# Patient Record
Sex: FEMALE | Race: WHITE | NOT HISPANIC OR LATINO | Employment: OTHER | URBAN - METROPOLITAN AREA
[De-identification: names, ages, dates, MRNs, and addresses within clinical notes are randomized per-mention and may not be internally consistent; named-entity substitution may affect disease eponyms.]

---

## 2017-01-09 ENCOUNTER — TELEPHONE (OUTPATIENT)
Dept: INTERNAL MEDICINE | Facility: CLINIC | Age: 57
End: 2017-01-09

## 2017-01-09 DIAGNOSIS — E03.9 HYPOTHYROIDISM (ACQUIRED): Primary | ICD-10-CM

## 2017-01-09 NOTE — TELEPHONE ENCOUNTER
----- Message from Maria De Jesus Pretty sent at 1/9/2017  8:52 AM EST -----  Contact: pt  Pt calling was advised to recheck TSH and Free T4 is 2-3 months back in October. Pt would like lab orders to schedule a lab. Please advise.     #[phone]    Dr. Nunez pt, Dr. Lemus covering

## 2017-02-06 NOTE — TELEPHONE ENCOUNTER
Maria De Jesus,    Can you please call Ms. Pineda and set her up for a lab appointment in the morning. I tried to call her to make an appointment myself and kept getting a busy signal.    Thank you,    Harry

## 2017-02-09 ENCOUNTER — LAB (OUTPATIENT)
Dept: INTERNAL MEDICINE | Facility: CLINIC | Age: 57
End: 2017-02-09

## 2017-02-09 DIAGNOSIS — E03.9 HYPOTHYROIDISM (ACQUIRED): ICD-10-CM

## 2017-02-09 LAB
T4 FREE SERPL-MCNC: 1.06 NG/DL (ref 0.93–1.7)
TSH SERPL-ACNC: 2.06 MIU/ML (ref 0.27–4.2)

## 2017-02-09 PROCEDURE — 36415 COLL VENOUS BLD VENIPUNCTURE: CPT | Performed by: INTERNAL MEDICINE

## 2017-02-13 ENCOUNTER — PATIENT MESSAGE (OUTPATIENT)
Dept: INTERNAL MEDICINE | Facility: CLINIC | Age: 57
End: 2017-02-13

## 2017-02-13 DIAGNOSIS — E78.5 HYPERLIPIDEMIA, ACQUIRED: Primary | ICD-10-CM

## 2017-02-13 RX ORDER — LEVOTHYROXINE SODIUM 0.05 MG/1
50 TABLET ORAL DAILY
Qty: 30 TABLET | Refills: 3 | Status: SHIPPED | OUTPATIENT
Start: 2017-02-13 | End: 2017-06-07 | Stop reason: SDUPTHER

## 2017-02-13 NOTE — TELEPHONE ENCOUNTER
From: Deb Pineda  To: Kathy Nunez MD  Sent: 2/13/2017 11:54 AM EST  Subject: Prescription Question    Just following up on my message from this weekend. My prescription runs out tomorrow. I left a message with the  on Thursday when I had blood drawn. The medicine is thyroxine. Thanks, Deb Pineda  1960

## 2017-04-20 RX ORDER — ESTRADIOL 1 MG/1
1 TABLET ORAL DAILY
Qty: 90 TABLET | Refills: 3 | Status: SHIPPED | OUTPATIENT
Start: 2017-04-20 | End: 2018-03-29

## 2017-06-07 DIAGNOSIS — E78.5 HYPERLIPIDEMIA, ACQUIRED: ICD-10-CM

## 2017-06-07 RX ORDER — LEVOTHYROXINE SODIUM 0.05 MG/1
TABLET ORAL
Qty: 30 TABLET | Refills: 2 | Status: SHIPPED | OUTPATIENT
Start: 2017-06-07 | End: 2017-09-06 | Stop reason: SDUPTHER

## 2017-06-09 ENCOUNTER — TELEPHONE (OUTPATIENT)
Dept: INTERNAL MEDICINE | Facility: CLINIC | Age: 57
End: 2017-06-09

## 2017-06-09 NOTE — TELEPHONE ENCOUNTER
----- Message from Deb Pineda sent at 6/9/2017  8:28 AM EDT -----  Regarding: Prescription Question  Contact: [phone]  I am out of refills for my levothyroxine 50 mcg. Could that please be called in to [facility] at [phone]?    Thanks.  Deb Pineda

## 2017-09-06 DIAGNOSIS — E78.5 HYPERLIPIDEMIA, ACQUIRED: ICD-10-CM

## 2017-09-06 RX ORDER — LEVOTHYROXINE SODIUM 0.05 MG/1
TABLET ORAL
Qty: 30 TABLET | Refills: 0 | Status: SHIPPED | OUTPATIENT
Start: 2017-09-06 | End: 2017-09-08 | Stop reason: SDUPTHER

## 2017-09-08 DIAGNOSIS — E78.5 HYPERLIPIDEMIA, ACQUIRED: ICD-10-CM

## 2017-09-08 RX ORDER — LEVOTHYROXINE SODIUM 0.05 MG/1
TABLET ORAL
Qty: 30 TABLET | Refills: 0 | Status: SHIPPED | OUTPATIENT
Start: 2017-09-08 | End: 2017-10-05 | Stop reason: SDUPTHER

## 2017-10-05 ENCOUNTER — PATIENT MESSAGE (OUTPATIENT)
Dept: INTERNAL MEDICINE | Facility: CLINIC | Age: 57
End: 2017-10-05

## 2017-10-05 DIAGNOSIS — E78.5 HYPERLIPIDEMIA, ACQUIRED: ICD-10-CM

## 2017-10-05 RX ORDER — LEVOTHYROXINE SODIUM 0.05 MG/1
50 TABLET ORAL DAILY
Qty: 90 TABLET | Refills: 0 | Status: SHIPPED | OUTPATIENT
Start: 2017-10-05 | End: 2018-01-10 | Stop reason: SDUPTHER

## 2017-10-05 NOTE — TELEPHONE ENCOUNTER
From: Deb Pineda  To: Kathy Nunez MD  Sent: 10/5/2017 11:15 AM EDT  Subject: Prescription Question    I have no refills on my prescription for Levothyroxine. Dr. Nunez called in a refill this past month and said I needed to get an appointment. My appointment is on January 11 but I will need refills until then. My pharmacy is [facility] [phone]. Thanks.

## 2018-01-10 DIAGNOSIS — E78.5 HYPERLIPIDEMIA, ACQUIRED: ICD-10-CM

## 2018-01-10 RX ORDER — LEVOTHYROXINE SODIUM 0.05 MG/1
TABLET ORAL
Qty: 90 TABLET | Refills: 1 | Status: SHIPPED | OUTPATIENT
Start: 2018-01-10 | End: 2018-03-29 | Stop reason: SDUPTHER

## 2018-01-17 ENCOUNTER — OFFICE VISIT (OUTPATIENT)
Dept: INTERNAL MEDICINE | Facility: CLINIC | Age: 58
End: 2018-01-17

## 2018-01-17 VITALS
HEART RATE: 86 BPM | OXYGEN SATURATION: 98 % | SYSTOLIC BLOOD PRESSURE: 118 MMHG | DIASTOLIC BLOOD PRESSURE: 80 MMHG | BODY MASS INDEX: 29.76 KG/M2 | WEIGHT: 190 LBS | TEMPERATURE: 98 F

## 2018-01-17 DIAGNOSIS — R11.11 VOMITING WITHOUT NAUSEA, INTRACTABILITY OF VOMITING NOT SPECIFIED, UNSPECIFIED VOMITING TYPE: Primary | ICD-10-CM

## 2018-01-17 DIAGNOSIS — R19.7 DIARRHEA, UNSPECIFIED TYPE: ICD-10-CM

## 2018-01-17 DIAGNOSIS — K52.9 GASTROENTERITIS: ICD-10-CM

## 2018-01-17 PROCEDURE — 99213 OFFICE O/P EST LOW 20 MIN: CPT | Performed by: NURSE PRACTITIONER

## 2018-01-17 NOTE — PROGRESS NOTES
Chavez Pineda is a 57 y.o. female.     HPI Comments: No recent travel. No known ID contact. No recent antibiotic use.     Vomiting    This is a new problem. The current episode started yesterday. The problem occurs 2 to 4 times per day. The problem has been gradually improving. The emesis has an appearance of stomach contents. There has been no fever. Associated symptoms include abdominal pain (cramping, better ), diarrhea and headaches. Pertinent negatives include no chest pain, chills, coughing, fever, myalgias, URI or weight loss. She has tried nothing for the symptoms.   Diarrhea    This is a new problem. The current episode started yesterday. The problem occurs 5 to 10 times per day. The problem has been gradually improving. The stool consistency is described as watery. The patient states that diarrhea awakens her from sleep. Associated symptoms include abdominal pain (cramping, better ), headaches and vomiting. Pertinent negatives include no bloating, chills, coughing, fever, increased  flatus, myalgias, URI or weight loss. Nothing aggravates the symptoms. There are no known risk factors. She has tried nothing for the symptoms.        The following portions of the patient's history were reviewed and updated as appropriate: allergies, current medications and problem list.    Review of Systems   Constitutional: Negative for activity change, appetite change, chills, fatigue, fever and weight loss.   Respiratory: Negative for cough, shortness of breath and wheezing.    Cardiovascular: Negative for chest pain.   Gastrointestinal: Positive for abdominal pain (cramping, better ), diarrhea and vomiting. Negative for bloating, blood in stool, constipation, flatus and nausea.   Genitourinary: Negative for dysuria, frequency, hematuria and urgency.   Musculoskeletal: Negative for myalgias.   Neurological: Positive for headaches.       Objective   Physical Exam   Constitutional: She is oriented to person,  place, and time. She appears well-developed and well-nourished.   HENT:   Head: Normocephalic.   Nose: Nose normal.   Cardiovascular: Regular rhythm and normal heart sounds.  Exam reveals no S3 and no S4.    No murmur heard.  Pulmonary/Chest: Effort normal and breath sounds normal. She has no decreased breath sounds. She has no wheezes. She has no rhonchi. She has no rales.   Abdominal: Normal appearance and bowel sounds are normal. There is no hepatosplenomegaly. There is no tenderness.   Musculoskeletal: She exhibits no edema.   Neurological: She is alert and oriented to person, place, and time. Gait normal.   Skin: Skin is warm and dry.   Psychiatric: She has a normal mood and affect.       Assessment/Plan   Deb was seen today for vomiting and diarrhea.    Diagnoses and all orders for this visit:    Vomiting without nausea, intractability of vomiting not specified, unspecified vomiting type  Comments:  resolved     Diarrhea, unspecified type  Comments:  push fluids, hydrate; may take pepto or imodium as needed     Gastroenteritis  Comments:  bland diet advance as tolerated     I suspect more viral. She will return if worsening of symptoms (abdominal pain, fever, etc).

## 2018-01-17 NOTE — PATIENT INSTRUCTIONS
Viral Gastroenteritis, Adult  Viral gastroenteritis is also known as the stomach flu. This condition is caused by various viruses. These viruses can be passed from person to person very easily (are very contagious). This condition may affect your stomach, small intestine, and large intestine. It can cause sudden watery diarrhea, fever, and vomiting.  Diarrhea and vomiting can make you feel weak and cause you to become dehydrated. You may not be able to keep fluids down. Dehydration can make you tired and thirsty, cause you to have a dry mouth, and decrease how often you urinate. Older adults and people with other diseases or a weak immune system are at higher risk for dehydration.  It is important to replace the fluids that you lose from diarrhea and vomiting. If you become severely dehydrated, you may need to get fluids through an IV tube.  What are the causes?  Gastroenteritis is caused by various viruses, including rotavirus and norovirus. Norovirus is the most common cause in adults.  You can get sick by eating food, drinking water, or touching a surface contaminated with one of these viruses. You can also get sick from sharing utensils or other personal items with an infected person.  What increases the risk?  This condition is more likely to develop in people:  · Who have a weak defense system (immune system).  · Who live with one or more children who are younger than 2 years old.  · Who live in a nursing home.  · Who go on cruise ships.  What are the signs or symptoms?  Symptoms of this condition start suddenly 1-2 days after exposure to a virus. Symptoms may last a few days or as long as a week. The most common symptoms are watery diarrhea and vomiting. Other symptoms include:  · Fever.  · Headache.  · Fatigue.  · Pain in the abdomen.  · Chills.  · Weakness.  · Nausea.  · Muscle aches.  · Loss of appetite.  How is this diagnosed?  This condition is diagnosed with a medical history and physical exam. You may  also have a stool test to check for viruses or other infections.  How is this treated?  This condition typically goes away on its own. The focus of treatment is to restore lost fluids (rehydration). Your health care provider may recommend that you take an oral rehydration solution (ORS) to replace important salts and minerals (electrolytes) in your body. Severe cases of this condition may require giving fluids through an IV tube.  Treatment may also include medicine to help with your symptoms.  Follow these instructions at home:  Follow instructions from your health care provider about how to care for yourself at home.  Eating and drinking  Follow these recommendations as told by your health care provider:  · Take an ORS. This is a drink that is sold at pharmacies and retail stores.  · Drink clear fluids in small amounts as you are able. Clear fluids include water, ice chips, diluted fruit juice, and low-calorie sports drinks.  · Eat bland, easy-to-digest foods in small amounts as you are able. These foods include bananas, applesauce, rice, lean meats, toast, and crackers.  · Avoid fluids that contain a lot of sugar or caffeine, such as energy drinks, sports drinks, and soda.  · Avoid alcohol.  · Avoid spicy or fatty foods.  General instructions  · Drink enough fluid to keep your urine clear or pale yellow.  · Wash your hands often. If soap and water are not available, use hand .  · Make sure that all people in your household wash their hands well and often.  · Take over-the-counter and prescription medicines only as told by your health care provider.  · Rest at home while you recover.  · Watch your condition for any changes.  · Take a warm bath to relieve any burning or pain from frequent diarrhea episodes.  · Keep all follow-up visits as told by your health care provider. This is important.  Contact a health care provider if:  · You cannot keep fluids down.  · Your symptoms get worse.  · You have new  symptoms.  · You feel light-headed or dizzy.  · You have muscle cramps.  Get help right away if:  · You have chest pain.  · You feel extremely weak or you faint.  · You see blood in your vomit.  · Your vomit looks like coffee grounds.  · You have bloody or black stools or stools that look like tar.  · You have a severe headache, a stiff neck, or both.  · You have a rash.  · You have severe pain, cramping, or bloating in your abdomen.  · You have trouble breathing or you are breathing very quickly.  · Your heart is beating very quickly.  · Your skin feels cold and clammy.  · You feel confused.  · You have pain when you urinate.  · You have signs of dehydration, such as:  ¨ Dark urine, very little urine, or no urine.  ¨ Cracked lips.  ¨ Dry mouth.  ¨ Sunken eyes.  ¨ Sleepiness.  ¨ Weakness.  This information is not intended to replace advice given to you by your health care provider. Make sure you discuss any questions you have with your health care provider.  Document Released: 12/18/2006 Document Revised: 05/31/2017 Document Reviewed: 08/23/2016  [employer] Interactive Patient Education © 2017 [employer]

## 2018-02-08 ENCOUNTER — OFFICE VISIT (OUTPATIENT)
Dept: OBSTETRICS AND GYNECOLOGY | Facility: CLINIC | Age: 58
End: 2018-02-08

## 2018-02-08 VITALS
BODY MASS INDEX: 30.13 KG/M2 | WEIGHT: 192 LBS | DIASTOLIC BLOOD PRESSURE: 80 MMHG | SYSTOLIC BLOOD PRESSURE: 142 MMHG | HEIGHT: 67 IN

## 2018-02-08 DIAGNOSIS — N95.0 POST-MENOPAUSAL BLEEDING: ICD-10-CM

## 2018-02-08 DIAGNOSIS — Z01.419 WELL WOMAN EXAM WITH ROUTINE GYNECOLOGICAL EXAM: Primary | ICD-10-CM

## 2018-02-08 PROCEDURE — 58100 BIOPSY OF UTERUS LINING: CPT | Performed by: OBSTETRICS & GYNECOLOGY

## 2018-02-08 PROCEDURE — 99213 OFFICE O/P EST LOW 20 MIN: CPT | Performed by: OBSTETRICS & GYNECOLOGY

## 2018-02-08 PROCEDURE — 99396 PREV VISIT EST AGE 40-64: CPT | Performed by: OBSTETRICS & GYNECOLOGY

## 2018-02-08 NOTE — PROGRESS NOTES
Chavez Pineda is a 57 y.o. female is here today as a self referral for annual.    History of Present Illness-here today for annual exam and checkup.  Also has had a couple episodes of vaginal bleeding in the past few months.    The following portions of the patient's history were reviewed and updated as appropriate: allergies, current medications, past family history, past medical history, past social history, past surgical history and problem list.    Review of Systems   Constitutional: Negative.    HENT: Negative.    Eyes: Negative.    Respiratory: Negative.    Cardiovascular: Negative.    Gastrointestinal: Negative.    Endocrine: Negative.    Genitourinary: Positive for vaginal bleeding.   Musculoskeletal: Negative.    Skin: Negative.    Allergic/Immunologic: Negative.    Neurological: Negative.    Hematological: Negative.    Psychiatric/Behavioral: Negative.        Objective   Physical Exam   Constitutional: She is oriented to person, place, and time. She appears well-developed and well-nourished.   HENT:   Head: Normocephalic and atraumatic.   Nose: Nose normal.   Eyes: Conjunctivae and EOM are normal. Pupils are equal, round, and reactive to light.   Neck: Normal range of motion. Neck supple. No thyromegaly present.   Cardiovascular: Normal rate, regular rhythm, normal heart sounds and intact distal pulses.  Exam reveals no gallop.    No murmur heard.  Pulmonary/Chest: Effort normal and breath sounds normal. No respiratory distress. She has no wheezes. She exhibits no mass, no tenderness, no swelling and no retraction. Right breast exhibits no inverted nipple, no mass, no nipple discharge, no skin change and no tenderness. Left breast exhibits no inverted nipple, no mass, no nipple discharge, no skin change and no tenderness.   Abdominal: Soft. Bowel sounds are normal. She exhibits no distension and no mass. There is no tenderness.   Genitourinary: Rectum normal, vagina normal and uterus normal.  There is no rash, tenderness, lesion or injury on the right labia. There is no rash, tenderness, lesion or injury on the left labia. Uterus is not enlarged and not tender. Cervix exhibits no motion tenderness and no discharge. Right adnexum displays no mass, no tenderness and no fullness. Left adnexum displays no mass, no tenderness and no fullness.   Musculoskeletal: Normal range of motion. She exhibits no edema, tenderness or deformity.   Neurological: She is alert and oriented to person, place, and time.   Skin: Skin is warm and dry.   Psychiatric: She has a normal mood and affect. Her behavior is normal. Judgment and thought content normal.   Nursing note and vitals reviewed.        Assessment/Plan   Problems Addressed this Visit     None      Visit Diagnoses     Well woman exam with routine gynecological exam    -  Primary    Relevant Orders    IGP,rfx Aptima HPV All Pth - ThinPrep Vial, Cervix    Post-menopausal bleeding        Relevant Orders    Biopsy Endometrial    Reference Histopathology - Tissue, Uterus        Pap smear and mammogram done today.  Current with colonoscopy.  DEXA scan discussed for next year.  Not interested in flu vaccine.    Procedure-endometrial biopsy performed with Pipelle without difficulty.  Single-tooth tenaculum applied to the cervix and cervix gently dilated.  Specimen obtained and patient tolerated the procedure well.  Patient to call in 1 week for biopsy report.  Patient also planning on discontinuing hormones.

## 2018-02-13 LAB
DX ICD CODE: NORMAL
DX ICD CODE: NORMAL
PATH REPORT.FINAL DX SPEC: NORMAL
PATH REPORT.GROSS SPEC: NORMAL
PATH REPORT.SITE OF ORIGIN SPEC: NORMAL
PATHOLOGIST NAME: NORMAL
PAYMENT PROCEDURE: NORMAL

## 2018-02-14 ENCOUNTER — TELEPHONE (OUTPATIENT)
Dept: OBSTETRICS AND GYNECOLOGY | Facility: CLINIC | Age: 58
End: 2018-02-14

## 2018-02-14 LAB
CONV .: NORMAL
CYTOLOGIST CVX/VAG CYTO: NORMAL
CYTOLOGY CVX/VAG DOC THIN PREP: NORMAL
DX ICD CODE: NORMAL
HIV 1 & 2 AB SER-IMP: NORMAL
Lab: NORMAL
OTHER STN SPEC: NORMAL
PATH REPORT.FINAL DX SPEC: NORMAL
STAT OF ADQ CVX/VAG CYTO-IMP: NORMAL

## 2018-03-29 ENCOUNTER — OFFICE VISIT (OUTPATIENT)
Dept: INTERNAL MEDICINE | Facility: CLINIC | Age: 58
End: 2018-03-29

## 2018-03-29 VITALS
HEIGHT: 67 IN | SYSTOLIC BLOOD PRESSURE: 118 MMHG | DIASTOLIC BLOOD PRESSURE: 82 MMHG | WEIGHT: 190 LBS | HEART RATE: 55 BPM | OXYGEN SATURATION: 98 % | BODY MASS INDEX: 29.82 KG/M2

## 2018-03-29 DIAGNOSIS — Z11.59 SCREENING FOR VIRAL DISEASE: ICD-10-CM

## 2018-03-29 DIAGNOSIS — Z00.00 ANNUAL PHYSICAL EXAM: Primary | ICD-10-CM

## 2018-03-29 DIAGNOSIS — E03.9 ACQUIRED HYPOTHYROIDISM: ICD-10-CM

## 2018-03-29 DIAGNOSIS — Z12.11 COLON CANCER SCREENING: ICD-10-CM

## 2018-03-29 DIAGNOSIS — Z83.71 FAMILY HISTORY OF COLONIC POLYPS: ICD-10-CM

## 2018-03-29 DIAGNOSIS — E78.5 HYPERLIPIDEMIA, ACQUIRED: ICD-10-CM

## 2018-03-29 LAB
ALBUMIN SERPL-MCNC: 4.4 G/DL (ref 3.5–5.2)
ALBUMIN/GLOB SERPL: 1.7 G/DL
ALP SERPL-CCNC: 49 U/L (ref 39–117)
ALT SERPL-CCNC: 31 U/L (ref 1–33)
AST SERPL-CCNC: 29 U/L (ref 1–32)
BASOPHILS # BLD AUTO: 0.05 10*3/MM3 (ref 0–0.2)
BASOPHILS NFR BLD AUTO: 0.8 % (ref 0–2)
BILIRUB SERPL-MCNC: 0.5 MG/DL (ref 0.1–1.2)
BILIRUB UR QL STRIP: NEGATIVE
BUN SERPL-MCNC: 18 MG/DL (ref 6–20)
BUN/CREAT SERPL: 21.2 (ref 7–25)
CALCIUM SERPL-MCNC: 9.5 MG/DL (ref 8.6–10.5)
CHLORIDE SERPL-SCNC: 101 MMOL/L (ref 98–107)
CHOLEST SERPL-MCNC: 230 MG/DL (ref 0–200)
CLARITY UR: CLEAR
CO2 SERPL-SCNC: 28.8 MMOL/L (ref 22–29)
COLOR UR: YELLOW
CREAT SERPL-MCNC: 0.85 MG/DL (ref 0.57–1)
DEPRECATED RDW RBC AUTO: 41.5 FL (ref 37–54)
EOSINOPHIL # BLD AUTO: 0.16 10*3/MM3 (ref 0–0.7)
EOSINOPHIL NFR BLD AUTO: 2.6 % (ref 0–5)
ERYTHROCYTE [DISTWIDTH] IN BLOOD BY AUTOMATED COUNT: 12.1 % (ref 11.5–15)
GLOBULIN SER CALC-MCNC: 2.6 GM/DL
GLUCOSE SERPL-MCNC: 91 MG/DL (ref 65–99)
GLUCOSE UR STRIP-MCNC: NEGATIVE MG/DL
HCT VFR BLD AUTO: 42.7 % (ref 34.1–44.9)
HDLC SERPL-MCNC: 62 MG/DL (ref 40–60)
HEMOCCULT STL QL IA: NEGATIVE
HGB BLD-MCNC: 14.1 G/DL (ref 11.2–15.7)
HGB UR QL STRIP.AUTO: NEGATIVE
KETONES UR QL STRIP: NEGATIVE
LDLC SERPL CALC-MCNC: 154 MG/DL (ref 0–100)
LEUKOCYTE ESTERASE UR QL STRIP.AUTO: NEGATIVE
LYMPHOCYTES # BLD AUTO: 1.87 10*3/MM3 (ref 0.8–7)
LYMPHOCYTES NFR BLD AUTO: 30.1 % (ref 10–60)
MCH RBC QN AUTO: 31.3 PG (ref 26–34)
MCHC RBC AUTO-ENTMCNC: 33 G/DL (ref 31–37)
MCV RBC AUTO: 94.9 FL (ref 80–100)
MONOCYTES # BLD AUTO: 0.5 10*3/MM3 (ref 0–1)
MONOCYTES NFR BLD AUTO: 8 % (ref 0–13)
NEUTROPHILS # BLD AUTO: 3.64 10*3/MM3 (ref 1–11)
NEUTROPHILS NFR BLD AUTO: 58.5 % (ref 30–85)
NITRITE UR QL STRIP: NEGATIVE
PH UR STRIP.AUTO: 5.5 [PH] (ref 5–8)
PLATELET # BLD AUTO: 210 10*3/MM3 (ref 150–450)
PMV BLD AUTO: 10.7 FL (ref 6–12)
POTASSIUM SERPL-SCNC: 4.2 MMOL/L (ref 3.5–5.2)
PROT SERPL-MCNC: 7 G/DL (ref 6–8.5)
PROT UR QL STRIP: NEGATIVE
RBC # BLD AUTO: 4.5 10*6/MM3 (ref 3.93–5.22)
SODIUM SERPL-SCNC: 141 MMOL/L (ref 136–145)
SP GR UR STRIP: 1.02 (ref 1–1.03)
TRIGL SERPL-MCNC: 71 MG/DL (ref 0–150)
TSH SERPL-ACNC: 2.96 MIU/ML (ref 0.27–4.2)
UROBILINOGEN UR QL STRIP: NORMAL
VLDLC SERPL-MCNC: 14.2 MG/DL (ref 5–40)
WBC NRBC COR # BLD: 6.22 10*3/MM3 (ref 5–10)

## 2018-03-29 PROCEDURE — 99396 PREV VISIT EST AGE 40-64: CPT | Performed by: INTERNAL MEDICINE

## 2018-03-29 PROCEDURE — 82274 ASSAY TEST FOR BLOOD FECAL: CPT | Performed by: INTERNAL MEDICINE

## 2018-03-29 PROCEDURE — 81003 URINALYSIS AUTO W/O SCOPE: CPT | Performed by: INTERNAL MEDICINE

## 2018-03-29 PROCEDURE — 85025 COMPLETE CBC W/AUTO DIFF WBC: CPT | Performed by: INTERNAL MEDICINE

## 2018-03-29 PROCEDURE — 36415 COLL VENOUS BLD VENIPUNCTURE: CPT | Performed by: INTERNAL MEDICINE

## 2018-03-29 RX ORDER — LEVOTHYROXINE SODIUM 0.05 MG/1
50 TABLET ORAL DAILY
Qty: 90 TABLET | Refills: 1 | Status: SHIPPED | OUTPATIENT
Start: 2018-03-29 | End: 2018-12-28 | Stop reason: SDUPTHER

## 2018-03-29 NOTE — PROGRESS NOTES
"Chavez Pineda is a 57 y.o. female here for   Chief Complaint   Patient presents with   • Annual Exam   • Hypothyroidism   .    Vitals:    03/29/18 1046   BP: 118/82   BP Location: Left arm   Patient Position: Sitting   Cuff Size: Adult   Pulse: 55   SpO2: 98%   Weight: 86.2 kg (190 lb)   Height: 170.2 cm (67\")       Body mass index is 29.76 kg/m².    Hypothyroidism   This is a chronic problem. The current episode started more than 1 year ago. The problem occurs constantly. The problem has been unchanged. Pertinent negatives include no abdominal pain, arthralgias, chest pain, chills, congestion, coughing, fatigue, fever, headaches, joint swelling, myalgias, nausea, neck pain, numbness, rash, sore throat, vomiting or weakness.        The following portions of the patient's history were reviewed and updated as appropriate: allergies, current medications, past social history and problem list.    Review of Systems   Constitutional: Negative for appetite change, chills, fatigue, fever and unexpected weight change.   HENT: Negative for congestion, ear pain, mouth sores, sinus pain, sore throat, tinnitus, trouble swallowing and voice change.    Eyes: Negative for pain and visual disturbance.   Respiratory: Negative for cough, choking, shortness of breath and wheezing.    Cardiovascular: Negative for chest pain, palpitations and leg swelling.   Gastrointestinal: Negative for abdominal pain, blood in stool, constipation, diarrhea, nausea and vomiting.   Endocrine: Negative for cold intolerance, heat intolerance, polydipsia and polyuria.   Genitourinary: Positive for enuresis. Negative for difficulty urinating, dysuria, flank pain (1-2x), frequency, hematuria, urgency and vaginal bleeding.   Musculoskeletal: Positive for neck stiffness. Negative for arthralgias, back pain, gait problem, joint swelling, myalgias and neck pain.   Skin: Negative for color change and rash.   Allergic/Immunologic: Positive for " environmental allergies. Negative for food allergies and immunocompromised state.   Neurological: Negative for dizziness, tremors, seizures, syncope, speech difficulty, weakness, numbness and headaches.   Hematological: Negative for adenopathy. Does not bruise/bleed easily.   Psychiatric/Behavioral: Negative for agitation, confusion, decreased concentration, sleep disturbance and suicidal ideas. The patient is not nervous/anxious.        Objective   Physical Exam   Constitutional: She appears well-developed and well-nourished.   HENT:   Right Ear: Hearing, tympanic membrane, external ear and ear canal normal.   Left Ear: Hearing, tympanic membrane, external ear and ear canal normal.   Nose: Right sinus exhibits no maxillary sinus tenderness and no frontal sinus tenderness. Left sinus exhibits no maxillary sinus tenderness and no frontal sinus tenderness.   Eyes: Conjunctivae, EOM and lids are normal. Pupils are equal, round, and reactive to light.   Neck: Trachea normal. Neck supple. No JVD present. Carotid bruit is not present. No tracheal deviation present. No thyroid mass and no thyromegaly present.   Cardiovascular: Normal rate, regular rhythm, S1 normal and S2 normal.  Exam reveals no gallop and no friction rub.    No murmur heard.  Pulses:       Carotid pulses are 2+ on the right side, and 2+ on the left side.       Radial pulses are 2+ on the right side, and 2+ on the left side.        Dorsalis pedis pulses are 2+ on the right side, and 2+ on the left side.        Posterior tibial pulses are 2+ on the right side, and 2+ on the left side.   Pulmonary/Chest: Effort normal and breath sounds normal. Chest wall is not dull to percussion. Right breast exhibits no inverted nipple, no mass, no nipple discharge, no skin change and no tenderness. Left breast exhibits no inverted nipple, no mass, no nipple discharge, no skin change and no tenderness.   Abdominal: Soft. Normal aorta and bowel sounds are normal. She  exhibits no abdominal bruit. There is no hepatosplenomegaly. There is no tenderness. There is no rebound and no guarding. No hernia.   Genitourinary: Rectum normal. Rectal exam shows no mass and guaiac negative stool.   Musculoskeletal: Normal range of motion. She exhibits no edema.   Lymphadenopathy:     She has no cervical adenopathy.     She has no axillary adenopathy.        Right: No supraclavicular adenopathy present.        Left: No supraclavicular adenopathy present.   Neurological: She is alert. She has normal strength. No cranial nerve deficit or sensory deficit. She displays a negative Romberg sign.   Reflex Scores:       Patellar reflexes are 2+ on the right side and 2+ on the left side.  Skin: Skin is warm and dry.   Nursing note and vitals reviewed.      Assessment/Plan   Diagnoses and all orders for this visit:    Annual physical exam  -     CBC Auto Differential; Future  -     Comprehensive Metabolic Panel; Future  -     Lipid Panel; Future  -     Urinalysis With / Microscopic If Indicated - Urine, Clean Catch; Future    Acquired hypothyroidism  Comments:  need TSH today & q 6-12 mos  Orders:  -     TSH Rfx On Abnormal To Free T4; Future  -     levothyroxine (SYNTHROID, LEVOTHROID) 50 MCG tablet; Take 1 tablet by mouth Daily.    Family history of colonic polyps  Comments:  she had 2 c-scopes - no need for repeat c-scope for 10 yrs per dr calle    Colon cancer screening  -     POC FECAL OCCULT BLOOD BY IMMUNOASSAY    Screening for viral disease  -     Hepatitis C antibody; Future    Hyperlipidemia, acquired  Comments:  need wt watchers diet again & daily exercise       Vitamin D=58.8.

## 2018-03-30 LAB — HCV AB S/CO SERPL IA: <0.1 S/CO RATIO (ref 0–0.9)

## 2018-04-26 RX ORDER — ESTRADIOL AND NORETHINDRONE ACETATE 1; .5 MG/1; MG/1
1 TABLET ORAL DAILY
Qty: 90 TABLET | Refills: 3 | Status: SHIPPED | OUTPATIENT
Start: 2018-04-26 | End: 2019-02-25 | Stop reason: SDUPTHER

## 2018-12-28 DIAGNOSIS — E03.9 ACQUIRED HYPOTHYROIDISM: ICD-10-CM

## 2018-12-28 RX ORDER — LEVOTHYROXINE SODIUM 0.05 MG/1
TABLET ORAL
Qty: 90 TABLET | Refills: 1 | Status: SHIPPED | OUTPATIENT
Start: 2018-12-28 | End: 2019-06-18 | Stop reason: SDUPTHER

## 2019-02-25 RX ORDER — ESTRADIOL AND NORETHINDRONE ACETATE 1; .5 MG/1; MG/1
1 TABLET ORAL DAILY
Qty: 90 TABLET | Refills: 0 | Status: SHIPPED | OUTPATIENT
Start: 2019-02-25 | End: 2019-05-29 | Stop reason: SDUPTHER

## 2019-05-29 ENCOUNTER — TELEPHONE (OUTPATIENT)
Dept: OBSTETRICS AND GYNECOLOGY | Facility: CLINIC | Age: 59
End: 2019-05-29

## 2019-05-29 RX ORDER — ESTRADIOL AND NORETHINDRONE ACETATE 1; .5 MG/1; MG/1
1 TABLET ORAL DAILY
Qty: 90 TABLET | Refills: 0 | Status: SHIPPED | OUTPATIENT
Start: 2019-05-29 | End: 2020-02-06

## 2019-05-29 NOTE — TELEPHONE ENCOUNTER
Patient called and is asking for a refill on estradiol-norethindrone (ACTIVELLA) 1-0.5 MG per tablet, she stated she has scheduled an annual appt with her PCP. They could not get her in for a couple of months and would not refill medication.         Thank you

## 2019-05-31 DIAGNOSIS — Z00.00 ANNUAL PHYSICAL EXAM: ICD-10-CM

## 2019-05-31 DIAGNOSIS — E78.5 HYPERLIPIDEMIA, ACQUIRED: ICD-10-CM

## 2019-05-31 DIAGNOSIS — E03.9 ACQUIRED HYPOTHYROIDISM: Primary | ICD-10-CM

## 2019-05-31 DIAGNOSIS — Z12.31 ENCOUNTER FOR SCREENING MAMMOGRAM FOR BREAST CANCER: Primary | ICD-10-CM

## 2019-06-03 ENCOUNTER — APPOINTMENT (OUTPATIENT)
Dept: WOMENS IMAGING | Facility: HOSPITAL | Age: 59
End: 2019-06-03

## 2019-06-03 ENCOUNTER — OFFICE VISIT (OUTPATIENT)
Dept: INTERNAL MEDICINE | Facility: CLINIC | Age: 59
End: 2019-06-03

## 2019-06-03 ENCOUNTER — TELEPHONE (OUTPATIENT)
Dept: INTERNAL MEDICINE | Facility: CLINIC | Age: 59
End: 2019-06-03

## 2019-06-03 ENCOUNTER — LAB (OUTPATIENT)
Dept: INTERNAL MEDICINE | Facility: CLINIC | Age: 59
End: 2019-06-03

## 2019-06-03 DIAGNOSIS — Z12.31 ENCOUNTER FOR SCREENING MAMMOGRAM FOR BREAST CANCER: ICD-10-CM

## 2019-06-03 DIAGNOSIS — E03.9 ACQUIRED HYPOTHYROIDISM: ICD-10-CM

## 2019-06-03 DIAGNOSIS — Z00.00 ANNUAL PHYSICAL EXAM: ICD-10-CM

## 2019-06-03 LAB
ALBUMIN SERPL-MCNC: 4.1 G/DL (ref 3.5–5.2)
ALBUMIN/GLOB SERPL: 1.8 G/DL
ALP SERPL-CCNC: 37 U/L (ref 39–117)
ALT SERPL-CCNC: 12 U/L (ref 1–33)
AST SERPL-CCNC: 14 U/L (ref 1–32)
BACTERIA UR QL AUTO: ABNORMAL /HPF
BASOPHILS # BLD AUTO: 0.05 10*3/MM3 (ref 0–0.2)
BASOPHILS NFR BLD AUTO: 1 % (ref 0–1.5)
BILIRUB SERPL-MCNC: 0.6 MG/DL (ref 0.2–1.2)
BILIRUB UR QL STRIP: NEGATIVE
BUN SERPL-MCNC: 12 MG/DL (ref 6–20)
BUN/CREAT SERPL: 13 (ref 7–25)
CALCIUM SERPL-MCNC: 9 MG/DL (ref 8.6–10.5)
CHLORIDE SERPL-SCNC: 105 MMOL/L (ref 98–107)
CHOLEST SERPL-MCNC: 158 MG/DL (ref 0–200)
CLARITY UR: CLEAR
CO2 SERPL-SCNC: 27.1 MMOL/L (ref 22–29)
COLOR UR: YELLOW
CREAT SERPL-MCNC: 0.92 MG/DL (ref 0.57–1)
EOSINOPHIL # BLD AUTO: 0.08 10*3/MM3 (ref 0–0.4)
EOSINOPHIL # BLD AUTO: 1.6 % (ref 0.3–6.2)
ERYTHROCYTE [DISTWIDTH] IN BLOOD BY AUTOMATED COUNT: 12.1 % (ref 12.3–15.4)
GLOBULIN SER CALC-MCNC: 2.3 GM/DL
GLUCOSE SERPL-MCNC: 97 MG/DL (ref 65–99)
GLUCOSE UR STRIP-MCNC: NEGATIVE MG/DL
HCT VFR BLD AUTO: 42.3 % (ref 34–46.6)
HDLC SERPL-MCNC: 56 MG/DL (ref 40–60)
HGB BLD-MCNC: 13.3 G/DL (ref 12–15.9)
HGB UR QL STRIP.AUTO: ABNORMAL
HYALINE CASTS UR QL AUTO: ABNORMAL /LPF
IMM GRANULOCYTES # BLD: 0.01 10*3/MM3 (ref 0–0.05)
IMM GRANULOCYTES NFR BLD: 0.2 % (ref 0–0.5)
KETONES UR QL STRIP: NEGATIVE
LDLC SERPL CALC-MCNC: 86 MG/DL (ref 0–100)
LEUKOCYTE ESTERASE UR QL STRIP.AUTO: ABNORMAL
LYMPHOCYTES # BLD AUTO: 1.51 10*3/MM3 (ref 0.7–3.1)
LYMPHOCYTES NFR BLD AUTO: 29.3 % (ref 19.6–45.3)
MCH RBC QN AUTO: 30.6 PG (ref 26.6–33)
MCHC RBC AUTO-ENTMCNC: 31.4 G/DL (ref 31.5–35.7)
MCV RBC AUTO: 97.5 FL (ref 79–97)
MONOCYTES # BLD AUTO: 0.37 10*3/MM3 (ref 0.1–0.9)
MONOCYTES NFR BLD AUTO: 7.2 % (ref 5–12)
MUCOUS THREADS URNS QL MICRO: ABNORMAL /HPF
NEUTROPHILS # BLD AUTO: 3.14 10*3/MM3 (ref 1.7–7)
NEUTROPHILS NFR BLD AUTO: 60.7 % (ref 42.7–76)
NITRITE UR QL STRIP: NEGATIVE
NRBC BLD AUTO-RTO: 0 /100 WBC (ref 0–0.2)
PH UR STRIP.AUTO: 6 [PH] (ref 5–8)
PLATELET # BLD AUTO: 196 10*3/MM3 (ref 140–450)
POTASSIUM SERPL-SCNC: 4.2 MMOL/L (ref 3.5–5.2)
PROT SERPL-MCNC: 6.4 G/DL (ref 6–8.5)
PROT UR QL STRIP: NEGATIVE
RBC # BLD AUTO: 4.34 10*6/MM3 (ref 3.77–5.28)
RBC # UR: ABNORMAL /HPF
REF LAB TEST METHOD: ABNORMAL
SODIUM SERPL-SCNC: 142 MMOL/L (ref 136–145)
SP GR UR STRIP: 1.02 (ref 1–1.03)
SQUAMOUS #/AREA URNS HPF: ABNORMAL /HPF
TRIGL SERPL-MCNC: 78 MG/DL (ref 0–150)
TSH SERPL-ACNC: 2.53 MIU/ML (ref 0.27–4.2)
UROBILINOGEN UR QL STRIP: ABNORMAL
VLDLC SERPL-MCNC: 15.6 MG/DL
WBC # BLD AUTO: 5.16 10*3/MM3 (ref 3.4–10.8)
WBC UR QL AUTO: ABNORMAL /HPF
YEAST URNS QL MICRO: ABNORMAL /HPF

## 2019-06-03 PROCEDURE — 77067 SCR MAMMO BI INCL CAD: CPT | Performed by: RADIOLOGY

## 2019-06-03 PROCEDURE — 81001 URINALYSIS AUTO W/SCOPE: CPT | Performed by: INTERNAL MEDICINE

## 2019-06-03 PROCEDURE — 77067 SCR MAMMO BI INCL CAD: CPT | Performed by: INTERNAL MEDICINE

## 2019-06-03 NOTE — TELEPHONE ENCOUNTER
Call made to Ms. Edwin per Babar in our lab to inform her that she had a trace of bacteria in her urine and to see if she had any symptoms per Dr. Nunez.    Per MsRosenda Edwin she isn't having any symptoms.

## 2019-06-18 ENCOUNTER — OFFICE VISIT (OUTPATIENT)
Dept: INTERNAL MEDICINE | Facility: CLINIC | Age: 59
End: 2019-06-18

## 2019-06-18 VITALS
HEART RATE: 63 BPM | DIASTOLIC BLOOD PRESSURE: 86 MMHG | SYSTOLIC BLOOD PRESSURE: 124 MMHG | BODY MASS INDEX: 27.51 KG/M2 | OXYGEN SATURATION: 99 % | WEIGHT: 171.2 LBS | RESPIRATION RATE: 15 BRPM | HEIGHT: 66 IN | TEMPERATURE: 98.6 F

## 2019-06-18 DIAGNOSIS — E03.9 ACQUIRED HYPOTHYROIDISM: ICD-10-CM

## 2019-06-18 DIAGNOSIS — E78.5 HYPERLIPIDEMIA, ACQUIRED: ICD-10-CM

## 2019-06-18 DIAGNOSIS — M54.2 MUSCULOSKELETAL NECK PAIN: ICD-10-CM

## 2019-06-18 DIAGNOSIS — Z00.00 ANNUAL PHYSICAL EXAM: Primary | ICD-10-CM

## 2019-06-18 DIAGNOSIS — Z83.71 FAMILY HISTORY OF COLONIC POLYPS: ICD-10-CM

## 2019-06-18 PROCEDURE — 99396 PREV VISIT EST AGE 40-64: CPT | Performed by: INTERNAL MEDICINE

## 2019-06-18 RX ORDER — LEVOTHYROXINE SODIUM 0.05 MG/1
50 TABLET ORAL DAILY
Qty: 90 TABLET | Refills: 3 | Status: SHIPPED | OUTPATIENT
Start: 2019-06-18 | End: 2020-07-06

## 2019-06-18 NOTE — PROGRESS NOTES
"Chavez Pineda is a 58 y.o. female here for   Chief Complaint   Patient presents with   • Annual Exam   • Hypothyroidism   • Neck Pain     x 1 month   .    Vitals:    06/18/19 1531   BP: 124/86   BP Location: Left arm   Patient Position: Sitting   Cuff Size: Adult   Pulse: 63   Resp: 15   Temp: 98.6 °F (37 °C)   TempSrc: Temporal   SpO2: 99%   Weight: 77.7 kg (171 lb 3.2 oz)   Height: 167.6 cm (66\")       Body mass index is 27.63 kg/m².    History of Present Illness     The following portions of the patient's history were reviewed and updated as appropriate: allergies, current medications, past social history and problem list.    Review of Systems   Constitutional: Negative for appetite change, chills, fatigue, fever and unexpected weight change.   HENT: Negative for congestion, ear pain, mouth sores, postnasal drip, sore throat, tinnitus, trouble swallowing and voice change.    Eyes: Negative for pain and visual disturbance.   Respiratory: Negative for cough, choking, shortness of breath and wheezing.    Cardiovascular: Negative for chest pain, palpitations and leg swelling.   Gastrointestinal: Negative for abdominal pain, blood in stool, constipation, diarrhea, nausea and vomiting.   Endocrine: Negative for cold intolerance, heat intolerance, polydipsia and polyuria.   Genitourinary: Positive for enuresis (1x). Negative for difficulty urinating, dysuria, flank pain, frequency, hematuria, urgency and vaginal bleeding.   Musculoskeletal: Positive for neck pain and neck stiffness. Negative for arthralgias, back pain, gait problem, joint swelling and myalgias.   Skin: Negative for color change and rash.   Allergic/Immunologic: Negative for environmental allergies, food allergies and immunocompromised state.   Neurological: Negative for dizziness, tremors, seizures, syncope, speech difficulty, weakness, numbness and headaches.   Hematological: Negative for adenopathy. Does not bruise/bleed easily. "   Psychiatric/Behavioral: Negative for agitation, confusion, decreased concentration, dysphoric mood, sleep disturbance and suicidal ideas. The patient is not nervous/anxious.        Objective   Physical Exam   Constitutional: She appears well-developed and well-nourished.   HENT:   Right Ear: Hearing, tympanic membrane, external ear and ear canal normal.   Left Ear: Hearing, tympanic membrane, external ear and ear canal normal.   Nose: Right sinus exhibits no maxillary sinus tenderness and no frontal sinus tenderness. Left sinus exhibits no maxillary sinus tenderness and no frontal sinus tenderness.   Eyes: Conjunctivae, EOM and lids are normal. Pupils are equal, round, and reactive to light.   Neck: Trachea normal. Neck supple. No JVD present. Carotid bruit is not present. No tracheal deviation present. No thyroid mass and no thyromegaly present.   Cardiovascular: Normal rate, regular rhythm, S1 normal and S2 normal. Exam reveals no gallop and no friction rub.   No murmur heard.  Pulses:       Carotid pulses are 2+ on the right side, and 2+ on the left side.       Radial pulses are 2+ on the right side, and 2+ on the left side.        Dorsalis pedis pulses are 2+ on the right side, and 2+ on the left side.        Posterior tibial pulses are 2+ on the right side, and 2+ on the left side.   Pulmonary/Chest: Effort normal and breath sounds normal. Chest wall is not dull to percussion. Right breast exhibits no inverted nipple, no mass, no nipple discharge, no skin change and no tenderness. Left breast exhibits no inverted nipple, no mass, no nipple discharge, no skin change and no tenderness.   Abdominal: Soft. Normal aorta and bowel sounds are normal. She exhibits no abdominal bruit. There is no hepatosplenomegaly. There is no tenderness. There is no rebound and no guarding. No hernia.   Musculoskeletal: Normal range of motion. She exhibits no edema.   Lymphadenopathy:     She has no cervical adenopathy.     She has  no axillary adenopathy.        Right: No supraclavicular adenopathy present.        Left: No supraclavicular adenopathy present.   Neurological: She is alert. She has normal strength. No cranial nerve deficit or sensory deficit. She displays a negative Romberg sign.   Reflex Scores:       Patellar reflexes are 2+ on the right side and 2+ on the left side.  Skin: Skin is warm and dry.   Nursing note and vitals reviewed.      Assessment/Plan   Diagnoses and all orders for this visit:    Annual physical exam    Family history of colonic polyps  Comments:  need c-scope q 5 yrs    Hyperlipidemia, acquired  Comments:  need diet/ex    Acquired hypothyroidism  Comments:  need rechk TSH q 6-12 mos (normal 2 wks ago)  Orders:  -     levothyroxine (SYNTHROID, LEVOTHROID) 50 MCG tablet; Take 1 tablet by mouth Daily.    Musculoskeletal neck pain  Comments:  need heat,massage,gentle stretches, correct posture,etc     All labs normal 6/3/2019.      Try to wean off estrogen (qod, then q 3 d,etc)- call if needing lower dose (0.5-0.2)

## 2019-06-18 NOTE — PATIENT INSTRUCTIONS
Health Maintenance, Female  Adopting a healthy lifestyle and getting preventive care can go a long way to promote health and wellness. Talk with your health care provider about what schedule of regular examinations is right for you. This is a good chance for you to check in with your provider about disease prevention and staying healthy.  In between checkups, there are plenty of things you can do on your own. Experts have done a lot of research about which lifestyle changes and preventive measures are most likely to keep you healthy. Ask your health care provider for more information.  Weight and diet  Eat a healthy diet  · Be sure to include plenty of vegetables, fruits, low-fat dairy products, and lean protein.  · Do not eat a lot of foods high in solid fats, added sugars, or salt.  · Get regular exercise. This is one of the most important things you can do for your health.  ? Most adults should exercise for at least 150 minutes each week. The exercise should increase your heart rate and make you sweat (moderate-intensity exercise).  ? Most adults should also do strengthening exercises at least twice a week. This is in addition to the moderate-intensity exercise.    Maintain a healthy weight  · Body mass index (BMI) is a measurement that can be used to identify possible weight problems. It estimates body fat based on height and weight. Your health care provider can help determine your BMI and help you achieve or maintain a healthy weight.  · For females 20 years of age and older:  ? A BMI below 18.5 is considered underweight.  ? A BMI of 18.5 to 24.9 is normal.  ? A BMI of 25 to 29.9 is considered overweight.  ? A BMI of 30 and above is considered obese.    Watch levels of cholesterol and blood lipids  · You should start having your blood tested for lipids and cholesterol at 20 years of age, then have this test every 5 years.  · You may need to have your cholesterol levels checked more often if:  ? Your lipid or  cholesterol levels are high.  ? You are older than 50 years of age.  ? You are at high risk for heart disease.    Cancer screening  Lung Cancer  · Lung cancer screening is recommended for adults 55-80 years old who are at high risk for lung cancer because of a history of smoking.  · A yearly low-dose CT scan of the lungs is recommended for people who:  ? Currently smoke.  ? Have quit within the past 15 years.  ? Have at least a 30-pack-year history of smoking. A pack year is smoking an average of one pack of cigarettes a day for 1 year.  · Yearly screening should continue until it has been 15 years since you quit.  · Yearly screening should stop if you develop a health problem that would prevent you from having lung cancer treatment.    Breast Cancer  · Practice breast self-awareness. This means understanding how your breasts normally appear and feel.  · It also means doing regular breast self-exams. Let your health care provider know about any changes, no matter how small.  · If you are in your 20s or 30s, you should have a clinical breast exam (CBE) by a health care provider every 1-3 years as part of a regular health exam.  · If you are 40 or older, have a CBE every year. Also consider having a breast X-ray (mammogram) every year.  · If you have a family history of breast cancer, talk to your health care provider about genetic screening.  · If you are at high risk for breast cancer, talk to your health care provider about having an MRI and a mammogram every year.  · Breast cancer gene (BRCA) assessment is recommended for women who have family members with BRCA-related cancers. BRCA-related cancers include:  ? Breast.  ? Ovarian.  ? Tubal.  ? Peritoneal cancers.  · Results of the assessment will determine the need for genetic counseling and BRCA1 and BRCA2 testing.    Cervical Cancer  Your health care provider may recommend that you be screened regularly for cancer of the pelvic organs (ovaries, uterus, and  vagina). This screening involves a pelvic examination, including checking for microscopic changes to the surface of your cervix (Pap test). You may be encouraged to have this screening done every 3 years, beginning at age 21.  · For women ages 30-65, health care providers may recommend pelvic exams and Pap testing every 3 years, or they may recommend the Pap and pelvic exam, combined with testing for human papilloma virus (HPV), every 5 years. Some types of HPV increase your risk of cervical cancer. Testing for HPV may also be done on women of any age with unclear Pap test results.  · Other health care providers may not recommend any screening for nonpregnant women who are considered low risk for pelvic cancer and who do not have symptoms. Ask your health care provider if a screening pelvic exam is right for you.  · If you have had past treatment for cervical cancer or a condition that could lead to cancer, you need Pap tests and screening for cancer for at least 20 years after your treatment. If Pap tests have been discontinued, your risk factors (such as having a new sexual partner) need to be reassessed to determine if screening should resume. Some women have medical problems that increase the chance of getting cervical cancer. In these cases, your health care provider may recommend more frequent screening and Pap tests.    Colorectal Cancer  · This type of cancer can be detected and often prevented.  · Routine colorectal cancer screening usually begins at 50 years of age and continues through 75 years of age.  · Your health care provider may recommend screening at an earlier age if you have risk factors for colon cancer.  · Your health care provider may also recommend using home test kits to check for hidden blood in the stool.  · A small camera at the end of a tube can be used to examine your colon directly (sigmoidoscopy or colonoscopy). This is done to check for the earliest forms of colorectal  cancer.  · Routine screening usually begins at age 50.  · Direct examination of the colon should be repeated every 5-10 years through 75 years of age. However, you may need to be screened more often if early forms of precancerous polyps or small growths are found.    Skin Cancer  · Check your skin from head to toe regularly.  · Tell your health care provider about any new moles or changes in moles, especially if there is a change in a mole's shape or color.  · Also tell your health care provider if you have a mole that is larger than the size of a pencil eraser.  · Always use sunscreen. Apply sunscreen liberally and repeatedly throughout the day.  · Protect yourself by wearing long sleeves, pants, a wide-brimmed hat, and sunglasses whenever you are outside.    Heart disease, diabetes, and high blood pressure  · High blood pressure causes heart disease and increases the risk of stroke. High blood pressure is more likely to develop in:  ? People who have blood pressure in the high end of the normal range (130-139/85-89 mm Hg).  ? People who are overweight or obese.  ? People who are .  · If you are 18-39 years of age, have your blood pressure checked every 3-5 years. If you are 40 years of age or older, have your blood pressure checked every year. You should have your blood pressure measured twice--once when you are at a hospital or clinic, and once when you are not at a hospital or clinic. Record the average of the two measurements. To check your blood pressure when you are not at a hospital or clinic, you can use:  ? An automated blood pressure machine at a pharmacy.  ? A home blood pressure monitor.  · If you are between 55 years and 79 years old, ask your health care provider if you should take aspirin to prevent strokes.  · Have regular diabetes screenings. This involves taking a blood sample to check your fasting blood sugar level.  ? If you are at a normal weight and have a low risk for  diabetes, have this test once every three years after 45 years of age.  ? If you are overweight and have a high risk for diabetes, consider being tested at a younger age or more often.  Preventing infection  Hepatitis B  · If you have a higher risk for hepatitis B, you should be screened for this virus. You are considered at high risk for hepatitis B if:  ? You were born in a country where hepatitis B is common. Ask your health care provider which countries are considered high risk.  ? Your parents were born in a high-risk country, and you have not been immunized against hepatitis B (hepatitis B vaccine).  ? You have HIV or AIDS.  ? You use needles to inject street drugs.  ? You live with someone who has hepatitis B.  ? You have had sex with someone who has hepatitis B.  ? You get hemodialysis treatment.  ? You take certain medicines for conditions, including cancer, organ transplantation, and autoimmune conditions.    Hepatitis C  · Blood testing is recommended for:  ? Everyone born from 1945 through 1965.  ? Anyone with known risk factors for hepatitis C.    Sexually transmitted infections (STIs)  · You should be screened for sexually transmitted infections (STIs) including gonorrhea and chlamydia if:  ? You are sexually active and are younger than 24 years of age.  ? You are older than 24 years of age and your health care provider tells you that you are at risk for this type of infection.  ? Your sexual activity has changed since you were last screened and you are at an increased risk for chlamydia or gonorrhea. Ask your health care provider if you are at risk.  · If you do not have HIV, but are at risk, it may be recommended that you take a prescription medicine daily to prevent HIV infection. This is called pre-exposure prophylaxis (PrEP). You are considered at risk if:  ? You are sexually active and do not regularly use condoms or know the HIV status of your partner(s).  ? You take drugs by injection.  ? You  are sexually active with a partner who has HIV.    Talk with your health care provider about whether you are at high risk of being infected with HIV. If you choose to begin PrEP, you should first be tested for HIV. You should then be tested every 3 months for as long as you are taking PrEP.  Pregnancy  · If you are premenopausal and you may become pregnant, ask your health care provider about preconception counseling.  · If you may become pregnant, take 400 to 800 micrograms (mcg) of folic acid every day.  · If you want to prevent pregnancy, talk to your health care provider about birth control (contraception).  Osteoporosis and menopause  · Osteoporosis is a disease in which the bones lose minerals and strength with aging. This can result in serious bone fractures. Your risk for osteoporosis can be identified using a bone density scan.  · If you are 65 years of age or older, or if you are at risk for osteoporosis and fractures, ask your health care provider if you should be screened.  · Ask your health care provider whether you should take a calcium or vitamin D supplement to lower your risk for osteoporosis.  · Menopause may have certain physical symptoms and risks.  · Hormone replacement therapy may reduce some of these symptoms and risks.  Talk to your health care provider about whether hormone replacement therapy is right for you.  Follow these instructions at home:  · Schedule regular health, dental, and eye exams.  · Stay current with your immunizations.  · Do not use any tobacco products including cigarettes, chewing tobacco, or electronic cigarettes.  · If you are pregnant, do not drink alcohol.  · If you are breastfeeding, limit how much and how often you drink alcohol.  · Limit alcohol intake to no more than 1 drink per day for nonpregnant women. One drink equals 12 ounces of beer, 5 ounces of wine, or 1½ ounces of hard liquor.  · Do not use street drugs.  · Do not share needles.  · Ask your health care  provider for help if you need support or information about quitting drugs.  · Tell your health care provider if you often feel depressed.  · Tell your health care provider if you have ever been abused or do not feel safe at home.  This information is not intended to replace advice given to you by your health care provider. Make sure you discuss any questions you have with your health care provider.  Document Released: 07/02/2012 Document Revised: 05/25/2017 Document Reviewed: 09/20/2016  [employer] Patient Education © 2019 [employer]

## 2020-01-29 ENCOUNTER — TELEPHONE (OUTPATIENT)
Dept: INTERNAL MEDICINE | Facility: CLINIC | Age: 60
End: 2020-01-29

## 2020-01-29 NOTE — TELEPHONE ENCOUNTER
Call made to Ms. Pineda to inform her she will need to have a signed release form from the hospital she was seen at. This is what was relayed to me from there HIM.

## 2020-01-29 NOTE — TELEPHONE ENCOUNTER
Patient called and stated that while she was in [place]. She had fell and hit her head and needed to get stitches. Patient is requesting for us to retrieve her medical records regarding incident. Hospital she was seen at is [facility]. Fax number [phone].  Incident happened on 1/27/2020.  Patient stated to list her name, date of birth and when incident happen in fax.  Patient wants a call back.    Please advise.   Patient callback:[phone].

## 2020-01-29 NOTE — TELEPHONE ENCOUNTER
Patient states that she faxed release over to the office to [place] today so that we can get those records.

## 2020-01-31 ENCOUNTER — TELEPHONE (OUTPATIENT)
Dept: INTERNAL MEDICINE | Facility: CLINIC | Age: 60
End: 2020-01-31

## 2020-01-31 NOTE — TELEPHONE ENCOUNTER
Patient called to check if medical records have been faxed in from [facility]. Please advise.    Pt. Call back [phone]

## 2020-01-31 NOTE — TELEPHONE ENCOUNTER
I spoke to Ms. Mojicaadrienne and informed her, as of now I haven't received any records for when she had her hospital visit in [place]. I told her that I would keep looking for the records and follow-up back up with her on Monday.

## 2020-02-06 ENCOUNTER — TELEPHONE (OUTPATIENT)
Dept: INTERNAL MEDICINE | Facility: CLINIC | Age: 60
End: 2020-02-06

## 2020-02-06 ENCOUNTER — OFFICE VISIT (OUTPATIENT)
Dept: INTERNAL MEDICINE | Facility: CLINIC | Age: 60
End: 2020-02-06

## 2020-02-06 VITALS
DIASTOLIC BLOOD PRESSURE: 80 MMHG | RESPIRATION RATE: 16 BRPM | SYSTOLIC BLOOD PRESSURE: 120 MMHG | OXYGEN SATURATION: 99 % | BODY MASS INDEX: 25.39 KG/M2 | HEART RATE: 74 BPM | HEIGHT: 66 IN | TEMPERATURE: 98.5 F | WEIGHT: 158 LBS

## 2020-02-06 DIAGNOSIS — J30.9 ALLERGIC RHINITIS, UNSPECIFIED SEASONALITY, UNSPECIFIED TRIGGER: ICD-10-CM

## 2020-02-06 DIAGNOSIS — R55 SYNCOPE AND COLLAPSE: Primary | ICD-10-CM

## 2020-02-06 DIAGNOSIS — E03.9 ACQUIRED HYPOTHYROIDISM: ICD-10-CM
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
## 2020-02-06 DIAGNOSIS — E78.5 HYPERLIPIDEMIA, ACQUIRED: ICD-10-CM

## 2020-02-06 PROCEDURE — 99214 OFFICE O/P EST MOD 30 MIN: CPT | Performed by: INTERNAL MEDICINE

## 2020-02-06 NOTE — TELEPHONE ENCOUNTER
Message left on v/m that I got a supervisor to release her records to me yesterday evening as our office still hadn't received them from when she herself requested for them to be faxed to our office.

## 2020-02-06 NOTE — TELEPHONE ENCOUNTER
Patient called in wanting to know if lab results were sent from [place].    Patient Contact: [phone]

## 2020-02-06 NOTE — PROGRESS NOTES
"Chavez Pineda is a 59 y.o. female here for   Chief Complaint   Patient presents with   • Fall     1/27/2020- ER visit at [facility]   • Head Injury     Front right side forehead- and hit the back of the head and sustained an injury that required two staples.   .    Vitals:    02/06/20 1021 02/06/20 1241   BP: 140/90 120/80   BP Location: Left arm    Patient Position: Sitting    Cuff Size: Adult    Pulse: 74    Resp: 16    Temp: 98.5 °F (36.9 °C)    TempSrc: Temporal    SpO2: 99%    Weight: 71.7 kg (158 lb)    Height: 167.6 cm (66\")        Body mass index is 25.5 kg/m².    Fall   The accident occurred more than 1 week ago. The fall occurred while standing. She landed on hard floor. The point of impact was the head. The pain is present in the head. The pain is mild. Pertinent negatives include no fever or numbness.        The following portions of the patient's history were reviewed and updated as appropriate: allergies, current medications, past social history and problem list.    Review of Systems   Constitutional: Negative for chills, fatigue and fever.   HENT: Positive for congestion.    Respiratory: Positive for cough (clearing throat ). Negative for shortness of breath and wheezing.    Cardiovascular: Negative for chest pain, palpitations and leg swelling.   Allergic/Immunologic: Positive for environmental allergies.   Neurological: Positive for syncope and light-headedness (better now). Negative for dizziness, tremors, facial asymmetry, speech difficulty, weakness and numbness.   Hematological: Negative for adenopathy. Does not bruise/bleed easily.   Psychiatric/Behavioral: Positive for decreased concentration (improving). Negative for dysphoric mood and sleep disturbance. The patient is not nervous/anxious.        Objective   Physical Exam   Constitutional: She is oriented to person, place, and time. She appears well-developed and well-nourished. No distress.   HENT:   Head: " Normocephalic.   Right Ear: External ear normal. Tympanic membrane is bulging. Tympanic membrane is not erythematous.   Left Ear: External ear normal. Tympanic membrane is bulging. Tympanic membrane is not erythematous.   Nose: Right sinus exhibits no frontal sinus tenderness. Left sinus exhibits no frontal sinus tenderness.   Mouth/Throat: Oropharynx is clear and moist. No oropharyngeal exudate.   Neck: Normal range of motion. Neck supple.   Cardiovascular: Normal rate, regular rhythm and normal heart sounds.   Pulmonary/Chest: Effort normal and breath sounds normal. No respiratory distress. She has no wheezes. She has no rales. She exhibits no tenderness.   Musculoskeletal: She exhibits no edema.   Lymphadenopathy:     She has no cervical adenopathy.   Neurological: She is alert and oriented to person, place, and time. No cranial nerve deficit or sensory deficit. She exhibits normal muscle tone. Coordination normal.   Skin: Skin is warm and dry.   Psychiatric: She has a normal mood and affect. Her behavior is normal.   Nursing note and vitals reviewed.    2 staples in top posterior scalp with healed laceration    Assessment/Plan   Diagnoses and all orders for this visit:    Syncope and collapse  Comments:  from vasovagal episode? - normal neuro exam today - need to always get up slowly - need echo & holter  Orders:  -     Adult Transthoracic Echo Complete W/ Cont if Necessary Per Protocol; Future  -     Holter monitor - 24 hour; Future    Fall, initial encounter  Comments:  fell 1/27 -seen in [place] ER (normal labs,cxr,ekg,ct)  Orders:  -     Adult Transthoracic Echo Complete W/ Cont if Necessary Per Protocol; Future  -     Holter monitor - 24 hour; Future    Acquired hypothyroidism  Comments:  need TSH at least yearly    Hyperlipidemia, acquired  Comments:  continue diet/ex    Allergic rhinitis, unspecified seasonality, unspecified trigger  Comments:  need daily antihis, nasonex daily & nasal saline tid -call  if incr sx     Need carotid doppler (info given today).     2 staples removed from posterior scalp - no blood loss b/c healed lac.     Answers for HPI/ROS submitted by the patient on 1/30/2020   What is the primary reason for your visit?: Other  Please describe your symptoms.: I was out of town and fell and hit my head then about 5 minutes later I passed out and hit the back of my head. Went to the er in [facility]. Several tests were done- ct scan, ekg, chest exray and blood work. All normal. They said to follow up at home. I have been light head some at home. Mostly when turning my head in bed or getting out of bed. , I sent a request to release the results and your office should have requested those. [facility].  Have you had these symptoms before?: No  How long have you been having these symptoms?: past week  Please list any medications you are currently taking for this condition.: Tylenol  Please describe any probable cause for these symptoms. : Hitting my head

## 2020-02-21 ENCOUNTER — HOSPITAL ENCOUNTER (OUTPATIENT)
Dept: CARDIOLOGY | Facility: HOSPITAL | Age: 60
Discharge: HOME | End: 2020-02-21
Admitting: INTERNAL MEDICINE

## 2020-02-21 ENCOUNTER — TRANSCRIBE ORDERS (OUTPATIENT)
Dept: ADMINISTRATIVE | Facility: HOSPITAL | Age: 60
End: 2020-02-21

## 2020-02-21 ENCOUNTER — HOSPITAL ENCOUNTER (OUTPATIENT)
Dept: CARDIOLOGY | Facility: HOSPITAL | Age: 60
Discharge: HOME | End: 2020-02-21

## 2020-02-21 VITALS
BODY MASS INDEX: 25.39 KG/M2 | WEIGHT: 158 LBS | HEIGHT: 66 IN | SYSTOLIC BLOOD PRESSURE: 126 MMHG | HEART RATE: 82 BPM | DIASTOLIC BLOOD PRESSURE: 82 MMHG
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
## 2020-02-21 DIAGNOSIS — R55 SYNCOPE AND COLLAPSE: ICD-10-CM

## 2020-02-21 DIAGNOSIS — Z13.6 ENCOUNTER FOR SCREENING FOR VASCULAR DISEASE: Primary | ICD-10-CM

## 2020-02-21 LAB
AORTIC DIMENSIONLESS INDEX: 0.9 (DI)
BH CV ECHO MEAS - ACS: 1.9 CM
BH CV ECHO MEAS - AO MAX PG: 9 MMHG
BH CV ECHO MEAS - AO MEAN PG (FULL): 2 MMHG
BH CV ECHO MEAS - AO MEAN PG: 5 MMHG
BH CV ECHO MEAS - AO ROOT AREA (BSA CORRECTED): 1.4
BH CV ECHO MEAS - AO ROOT AREA: 5.3 CM^2
BH CV ECHO MEAS - AO ROOT DIAM: 2.6 CM
BH CV ECHO MEAS - AO V2 MAX: 146 CM/SEC
BH CV ECHO MEAS - AO V2 MEAN: 98.5 CM/SEC
BH CV ECHO MEAS - AO V2 VTI: 28.8 CM
BH CV ECHO MEAS - AVA(I,A): 2.7 CM^2
BH CV ECHO MEAS - AVA(I,D): 2.7 CM^2
BH CV ECHO MEAS - BSA(HAYCOCK): 1.8 M^2
BH CV ECHO MEAS - BSA: 1.8 M^2
BH CV ECHO MEAS - BZI_BMI: 25.5 KILOGRAMS/M^2
BH CV ECHO MEAS - BZI_METRIC_HEIGHT: 167.6 CM
BH CV ECHO MEAS - BZI_METRIC_WEIGHT: 71.7 KG
BH CV ECHO MEAS - EDV(CUBED): 97.3 ML
BH CV ECHO MEAS - EDV(MOD-SP2): 71 ML
BH CV ECHO MEAS - EDV(MOD-SP4): 77 ML
BH CV ECHO MEAS - EDV(TEICH): 97.3 ML
BH CV ECHO MEAS - EF(CUBED): 69.4 %
BH CV ECHO MEAS - EF(MOD-BP): 61 %
BH CV ECHO MEAS - EF(MOD-SP2): 63.4 %
BH CV ECHO MEAS - EF(MOD-SP4): 58.4 %
BH CV ECHO MEAS - EF(TEICH): 61 %
BH CV ECHO MEAS - ESV(CUBED): 29.8 ML
BH CV ECHO MEAS - ESV(MOD-SP2): 26 ML
BH CV ECHO MEAS - ESV(MOD-SP4): 32 ML
BH CV ECHO MEAS - ESV(TEICH): 37.9 ML
BH CV ECHO MEAS - FS: 32.6 %
BH CV ECHO MEAS - IVS/LVPW: 0.92
BH CV ECHO MEAS - IVSD: 1.1 CM
BH CV ECHO MEAS - LA DIMENSION: 4 CM
BH CV ECHO MEAS - LA/AO: 1.5
BH CV ECHO MEAS - LAT PEAK E' VEL: 11.6 CM/SEC
BH CV ECHO MEAS - LV DIASTOLIC VOL/BSA (35-75): 42.6 ML/M^2
BH CV ECHO MEAS - LV MASS(C)D: 192.9 GRAMS
BH CV ECHO MEAS - LV MASS(C)DI: 106.6 GRAMS/M^2
BH CV ECHO MEAS - LV MEAN PG: 3 MMHG
BH CV ECHO MEAS - LV SYSTOLIC VOL/BSA (12-30): 17.7 ML/M^2
BH CV ECHO MEAS - LV V1 MAX: 115 CM/SEC
BH CV ECHO MEAS - LV V1 MEAN: 79 CM/SEC
BH CV ECHO MEAS - LV V1 VTI: 25.2 CM
BH CV ECHO MEAS - LVIDD: 4.6 CM
BH CV ECHO MEAS - LVIDS: 3.1 CM
BH CV ECHO MEAS - LVLD AP2: 6.7 CM
BH CV ECHO MEAS - LVLD AP4: 7.2 CM
BH CV ECHO MEAS - LVLS AP2: 5.7 CM
BH CV ECHO MEAS - LVLS AP4: 6.2 CM
BH CV ECHO MEAS - LVOT AREA (M): 3.1 CM^2
BH CV ECHO MEAS - LVOT AREA: 3.1 CM^2
BH CV ECHO MEAS - LVOT DIAM: 2 CM
BH CV ECHO MEAS - LVPWD: 1.2 CM
BH CV ECHO MEAS - MED PEAK E' VEL: 9.9 CM/SEC
BH CV ECHO MEAS - MR MAX PG: 61.5 MMHG
BH CV ECHO MEAS - MR MAX VEL: 392 CM/SEC
BH CV ECHO MEAS - MV A DUR: 0.16 SEC
BH CV ECHO MEAS - MV A MAX VEL: 95.3 CM/SEC
BH CV ECHO MEAS - MV DEC SLOPE: 330 CM/SEC^2
BH CV ECHO MEAS - MV DEC TIME: 0.23 SEC
BH CV ECHO MEAS - MV E MAX VEL: 80.9 CM/SEC
BH CV ECHO MEAS - MV E/A: 0.85
BH CV ECHO MEAS - MV MEAN PG: 2 MMHG
BH CV ECHO MEAS - MV P1/2T MAX VEL: 79.1 CM/SEC
BH CV ECHO MEAS - MV P1/2T: 70.2 MSEC
BH CV ECHO MEAS - MV V2 MEAN: 64.7 CM/SEC
BH CV ECHO MEAS - MV V2 VTI: 24.7 CM
BH CV ECHO MEAS - MVA P1/2T LCG: 2.8 CM^2
BH CV ECHO MEAS - MVA(P1/2T): 3.1 CM^2
BH CV ECHO MEAS - MVA(VTI): 3.2 CM^2
BH CV ECHO MEAS - PA ACC SLOPE: 514 CM/SEC^2
BH CV ECHO MEAS - PA ACC TIME: 0.14 SEC
BH CV ECHO MEAS - PA MAX PG (FULL): 0.58 MMHG
BH CV ECHO MEAS - PA MAX PG: 2.1 MMHG
BH CV ECHO MEAS - PA PR(ACCEL): 17.4 MMHG
BH CV ECHO MEAS - PA V2 MAX: 71.9 CM/SEC
BH CV ECHO MEAS - PI END-D VEL: 99.1 CM/SEC
BH CV ECHO MEAS - PULM A REVS DUR: 0.18 SEC
BH CV ECHO MEAS - PULM A REVS VEL: 38.5 CM/SEC
BH CV ECHO MEAS - PULM DIAS VEL: 46.9 CM/SEC
BH CV ECHO MEAS - PULM S/D: 1.3
BH CV ECHO MEAS - PULM SYS VEL: 61.2 CM/SEC
BH CV ECHO MEAS - PVA(V,A): 4.2 CM^2
BH CV ECHO MEAS - PVA(V,D): 4.2 CM^2
BH CV ECHO MEAS - QP/QS: 0.81
BH CV ECHO MEAS - RAP SYSTOLE: 3 MMHG
BH CV ECHO MEAS - RV MAX PG: 1.5 MMHG
BH CV ECHO MEAS - RV MEAN PG: 1 MMHG
BH CV ECHO MEAS - RV V1 MAX: 60.9 CM/SEC
BH CV ECHO MEAS - RV V1 MEAN: 38.5 CM/SEC
BH CV ECHO MEAS - RV V1 VTI: 13 CM
BH CV ECHO MEAS - RVOT AREA: 4.9 CM^2
BH CV ECHO MEAS - RVOT DIAM: 2.5 CM
BH CV ECHO MEAS - RVSP: 25 MMHG
BH CV ECHO MEAS - SI(AO): 84.5 ML/M^2
BH CV ECHO MEAS - SI(CUBED): 37.3 ML/M^2
BH CV ECHO MEAS - SI(LVOT): 43.8 ML/M^2
BH CV ECHO MEAS - SI(MOD-SP2): 24.9 ML/M^2
BH CV ECHO MEAS - SI(MOD-SP4): 24.9 ML/M^2
BH CV ECHO MEAS - SI(TEICH): 32.8 ML/M^2
BH CV ECHO MEAS - SV(AO): 152.9 ML
BH CV ECHO MEAS - SV(CUBED): 67.5 ML
BH CV ECHO MEAS - SV(LVOT): 79.2 ML
BH CV ECHO MEAS - SV(MOD-SP2): 45 ML
BH CV ECHO MEAS - SV(MOD-SP4): 45 ML
BH CV ECHO MEAS - SV(RVOT): 63.8 ML
BH CV ECHO MEAS - SV(TEICH): 59.4 ML
BH CV ECHO MEAS - TAPSE (>1.6): 2.3 CM2
BH CV ECHO MEAS - TR MAX VEL: 233 CM/SEC
BH CV ECHO MEASUREMENTS AVERAGE E/E' RATIO: 7.53
BH CV XLRA - RV BASE: 3.7 CM
BH CV XLRA - RV LENGTH: 6.2 CM
BH CV XLRA - RV MID: 2.2 CM
BH CV XLRA - TDI S': 13.5 CM/SEC
LEFT ATRIUM VOLUME INDEX: 37 ML/M2
LV EF 2D ECHO EST: 61 %
MAXIMAL PREDICTED HEART RATE: 160 BPM
STRESS TARGET HR: 136 BPM

## 2020-02-21 PROCEDURE — 93306 TTE W/DOPPLER COMPLETE: CPT | Performed by: INTERNAL MEDICINE

## 2020-02-21 PROCEDURE — 93225 XTRNL ECG REC<48 HRS REC: CPT

## 2020-02-21 PROCEDURE — 93226 XTRNL ECG REC<48 HR SCAN A/R: CPT

## 2020-02-21 PROCEDURE — 93306 TTE W/DOPPLER COMPLETE: CPT

## 2020-02-24 PROCEDURE — 93227 XTRNL ECG REC<48 HR R&I: CPT | Performed by: INTERNAL MEDICINE

## 2020-02-26 ENCOUNTER — TELEPHONE (OUTPATIENT)
Dept: INTERNAL MEDICINE | Facility: CLINIC | Age: 60
End: 2020-02-26

## 2020-02-26 ENCOUNTER — PATIENT MESSAGE (OUTPATIENT)
Dept: INTERNAL MEDICINE | Facility: CLINIC | Age: 60
End: 2020-02-26

## 2020-02-26 DIAGNOSIS — R55 SYNCOPE, UNSPECIFIED SYNCOPE TYPE: Primary | ICD-10-CM

## 2020-02-26 DIAGNOSIS — R94.31 ABNORMAL HOLTER EXAM: ICD-10-CM

## 2020-02-26 NOTE — TELEPHONE ENCOUNTER
From: Deb Pineda  Sent: 2/26/2020 2:36 PM EST  To: [facility]  Subject: RE: Test Results Question    Thanks so much     Deb Pineda  ----- Message -----  From: Chantal KIM  Sent: 2/26/2020 2:31 PM EST  To: Deb Pineda  Subject: RE: Test Results Question  Hi Ms. Pineda. I have changed your referral to Dr Jain, You are good to schedule with him. Please let us know if there is anything else you need.     Thanks    [facility]  Referral Coordinator    ----- Message -----   From: Deb Pineda   Sent: 2/26/2020 2:07 PM EST   To: Kathy Nunez MD  Subject: Test Results Question    I received your voicemail today. I called the office you referred me to. I didn’t have a specific dr to ask for so I got the earliest appointment with Dr Jain. When I called your office after that they informed me I need to see Dr York. I can see Dr Jain on Monday but can’t see Dr York until April 23. What do you suggest I do? I would prefer to see someone soon. Thanks.   Deb Pineda

## 2020-02-27 ENCOUNTER — HOSPITAL ENCOUNTER (OUTPATIENT)
Dept: CARDIOLOGY | Facility: HOSPITAL | Age: 60
Discharge: HOME | End: 2020-02-27
Admitting: INTERNAL MEDICINE

## 2020-02-27 ENCOUNTER — PATIENT MESSAGE (OUTPATIENT)
Dept: INTERNAL MEDICINE | Facility: CLINIC | Age: 60
End: 2020-02-27

## 2020-02-27 VITALS
HEART RATE: 68 BPM | HEIGHT: 67 IN | SYSTOLIC BLOOD PRESSURE: 125 MMHG | BODY MASS INDEX: 25.11 KG/M2 | DIASTOLIC BLOOD PRESSURE: 69 MMHG | WEIGHT: 160 LBS

## 2020-02-27 DIAGNOSIS — Z13.6 ENCOUNTER FOR SCREENING FOR VASCULAR DISEASE: ICD-10-CM

## 2020-02-27 LAB
BH CV VAS BP LEFT ARM: NORMAL MMHG
BH CV VAS BP RIGHT ARM: NORMAL MMHG
BH CV XLRA MEAS LEFT ICA/CCA RATIO: 1.1
BH CV XLRA MEAS LEFT MID CCA PSV: NORMAL CM/SEC
BH CV XLRA MEAS LEFT MID ICA PSV: NORMAL CM/SEC
BH CV XLRA MEAS LEFT PROX ECA PSV: NORMAL CM/SEC
BH CV XLRA MEAS RIGHT ICA/CCA RATIO: 1.08
BH CV XLRA MEAS RIGHT MID CCA PSV: NORMAL CM/SEC
BH CV XLRA MEAS RIGHT MID ICA PSV: NORMAL CM/SEC
BH CV XLRA MEAS RIGHT PROX ECA PSV: NORMAL CM/SEC

## 2020-02-27 PROCEDURE — 93799 UNLISTED CV SVC/PROCEDURE: CPT

## 2020-02-27 NOTE — TELEPHONE ENCOUNTER
From: Deb Pineda  Sent: 2/27/2020 12:11 PM EST  To: [facility]  Subject: RE: Visit Follow-Up Question    Great. Thanks so much.  ----- Message -----  From: NAHEED MCGRAW  Sent: 2/27/2020 11:46 AM EST  To: Deb Pineda  Subject: RE: Visit Follow-Up Question  Hi Ms. Pineda. It does look like they were scanned in, so they should be able to see them.     ----- Message -----   From: Deb Pineda   Sent: 2/27/2020 10:27 AM EST   To: Kathy Nunez MD  Subject: Visit Follow-Up Question    [facility] into the [employer] system so the cardiologist can view them. Thanks   Deb Pineda

## 2020-03-02 ENCOUNTER — OFFICE VISIT (OUTPATIENT)
Dept: CARDIOLOGY | Facility: CLINIC | Age: 60
End: 2020-03-02

## 2020-03-02 ENCOUNTER — LAB (OUTPATIENT)
Dept: LAB | Facility: HOSPITAL | Age: 60
End: 2020-03-02

## 2020-03-02 VITALS
HEART RATE: 76 BPM | RESPIRATION RATE: 16 BRPM | HEIGHT: 67 IN | BODY MASS INDEX: 25.27 KG/M2 | SYSTOLIC BLOOD PRESSURE: 128 MMHG | WEIGHT: 161 LBS | OXYGEN SATURATION: 98 % | DIASTOLIC BLOOD PRESSURE: 88 MMHG

## 2020-03-02 DIAGNOSIS — R00.2 PALPITATIONS: ICD-10-CM

## 2020-03-02 DIAGNOSIS — R55 SYNCOPE AND COLLAPSE: Primary | ICD-10-CM

## 2020-03-02 DIAGNOSIS — E03.9 HYPOTHYROIDISM (ACQUIRED): ICD-10-CM

## 2020-03-02 LAB
T-UPTAKE NFR SERPL: 0.99 TBI (ref 0.8–1.3)
T4 SERPL-MCNC: 5.12 MCG/DL (ref 4.5–11.7)
TSH SERPL DL<=0.05 MIU/L-ACNC: 3.13 UIU/ML (ref 0.27–4.2)

## 2020-03-02 PROCEDURE — 84436 ASSAY OF TOTAL THYROXINE: CPT

## 2020-03-02 PROCEDURE — 84443 ASSAY THYROID STIM HORMONE: CPT

## 2020-03-02 PROCEDURE — 99204 OFFICE O/P NEW MOD 45 MIN: CPT | Performed by: INTERNAL MEDICINE

## 2020-03-02 PROCEDURE — 84479 ASSAY OF THYROID (T3 OR T4): CPT

## 2020-03-02 PROCEDURE — 93000 ELECTROCARDIOGRAM COMPLETE: CPT | Performed by: INTERNAL MEDICINE

## 2020-03-02 PROCEDURE — 36415 COLL VENOUS BLD VENIPUNCTURE: CPT

## 2020-03-02 NOTE — PROGRESS NOTES
[facility]      Patient Name: Deb Pineda  :1960  Age: 59 y.o.  Encounter Provider:  Genaro Jain Jr, MD      Chief Complaint:   Chief Complaint   Patient presents with   • Palpitations         HPI  Deb Pineda is a 59 y.o. female past medical history of hypothyroidism who presents for evaluation of syncope.  Patient was in [place] in the end of January and felt slightly dizzy when sitting up from a lying position.  She stood up to go to the bathroom and fell hitting her head on the counter.  She states that her legs just gave out and does not think that she lost consciousness.  She walked in to see her  in the kitchen and when he was looking at the injury on her head her eyes rolled backwards and she passed out.  It was only for a few seconds and there was no convulsive activity.  There was no loss of bowel or bladder continence.  When she awoke she did not feel postictal.  She had some dizziness still but no chest pain, shortness of air or palpitations.  Although she did not notice palpitations with this episode she does have occasional palpitations which lasts for seconds to minutes.  She is very active and does not have any limiting symptoms.  They did a Holter monitor which showed frequent supraventricular ectopy.  The patient did not keep a diary during this monitoring period as she was not told to do so.  Echocardiogram was performed showing normal left ventricular ejection fraction and no significant valvular heart disease.  She has had no recurrent syncope but does note occasional positional dizziness.  She is a lifelong non-smoker who drinks occasionally and denies illicit drug use.  She denies any fevers chills cough or otherwise infectious prodrome.  Both parents were noted to have atrial fibrillation.  She also has a brother who  of ruptured aortic aneurysm but she is unsure of whether or not this was thoracic versus abdominal.  She had her TSH checked in  "June of last year which was within normal limits but no recent check since then.        The following portions of the patient's history were reviewed and updated as appropriate: allergies, current medications, past family history, past medical history, past social history, past surgical history and problem list.      Review of Systems   Constitution: Negative for chills and fever.   HENT: Negative for hoarse voice and sore throat.    Eyes: Negative for double vision and photophobia.   Cardiovascular: Positive for palpitations and syncope. Negative for chest pain, leg swelling, near-syncope, orthopnea and paroxysmal nocturnal dyspnea.   Respiratory: Negative for cough and wheezing.    Skin: Negative for poor wound healing and rash.   Musculoskeletal: Negative for arthritis and joint swelling.   Gastrointestinal: Negative for bloating, abdominal pain, hematemesis and hematochezia.   Neurological: Positive for dizziness. Negative for focal weakness.   Psychiatric/Behavioral: Negative for depression and suicidal ideas.   All other systems reviewed and are negative.      OBJECTIVE:   Vital Signs  Vitals:    03/02/20 1044   BP: 128/88   Pulse: 76   Resp: 16   SpO2: 98%     Estimated body mass index is 25.6 kg/m² as calculated from the following:    Height as of this encounter: 168.9 cm (66.5\").    Weight as of this encounter: 73 kg (161 lb).    Physical Exam   Constitutional: She is oriented to person, place, and time. She appears well-developed and well-nourished.   HENT:   Head: Normocephalic and atraumatic.   Eyes: Pupils are equal, round, and reactive to light. Conjunctivae are normal.   Neck: No JVD present. No thyromegaly present.   Cardiovascular: Exam reveals no gallop and no friction rub.   No murmur heard.  Pulmonary/Chest: No respiratory distress. She exhibits no tenderness.   Abdominal: Bowel sounds are normal. She exhibits no distension.   Musculoskeletal: She exhibits no edema or tenderness.   Neurological: " She is alert and oriented to person, place, and time.   Skin: No rash noted. No erythema.   Psychiatric: She has a normal mood and affect. Judgment normal.   Vitals reviewed.        ECG 12 Lead  Date/Time: 3/2/2020 4:55 PM  Performed by: Genaro Jain Jr., MD  Authorized by: Genaro Jain Jr., MD   Previous ECG: no previous ECG available  Rhythm: sinus rhythm  QRS axis: right    Clinical impression: abnormal EKG                  ASSESSMENT:      Diagnosis Plan   1. Palpitations           PLAN OF CARE:     1. Syncope -etiology is uncertain.  Patient did have some alcohol the night before this episode and does have occasional positional dizziness.  Uncertain if this was a mechanical fall and then concussion with hitting her head which led to the syncope.  She still does note some posterior neck and lower cranial discomfort with changes of head position which sound consistent with postconcussive syndrome.  Given the increased supraventricular ectopy I think it is reasonable to perform a longer period of ambulatory telemetry.  We will order a 7-day ZIO patch.  Patient was cautioned on need for patients with positional changes and lifestyle modification such as remaining well-hydrated.  2. Hypothyroidism -check TSH  3. Family history of cardiovascular disease -brother  at a young age of ruptured aneurysm.  She will collect more data to understand whether or not this was thoracic or abdominal aneurysm so that we will know how to proceed with screening.    Return to clinic 3 months             Discharge Medications           Accurate as of 2020 10:50 AM. If you have any questions, ask your nurse or doctor.               Continue These Medications      Instructions Start Date   levothyroxine 50 MCG tablet  Commonly known as:  SYNTHROID, LEVOTHROID   50 mcg, Oral, Daily      MULTI VITAMIN DAILY PO   1 tablet, Oral, Daily             Thank you for allowing me to participate in the care of your  patient,      Sincerely,   Genaro Jain MD  [facility]  03/02/20  10:50 AM

## 2020-03-18 ENCOUNTER — TELEPHONE (OUTPATIENT)
Dept: CARDIOLOGY | Facility: CLINIC | Age: 60
End: 2020-03-18

## 2020-03-18 RX ORDER — ATENOLOL 25 MG/1
25 TABLET ORAL DAILY
Qty: 30 TABLET | Refills: 3 | Status: SHIPPED | OUTPATIENT
Start: 2020-03-18 | End: 2020-07-07

## 2020-03-18 NOTE — TELEPHONE ENCOUNTER
Patient called back and wanted to let you know that she got your message and that she will go ahead and start on the medication that you sent in.  She asked that you call her back.  Please advise.    CB: [phone]    Thanks,  Alberto

## 2020-04-16 ENCOUNTER — TELEPHONE (OUTPATIENT)
Dept: INTERNAL MEDICINE | Facility: CLINIC | Age: 60
End: 2020-04-16

## 2020-04-16 ENCOUNTER — TELEMEDICINE (OUTPATIENT)
Dept: INTERNAL MEDICINE | Facility: CLINIC | Age: 60
End: 2020-04-16

## 2020-04-16 DIAGNOSIS — E03.9 ACQUIRED HYPOTHYROIDISM: ICD-10-CM

## 2020-04-16 DIAGNOSIS — J30.9 ALLERGIC RHINITIS, UNSPECIFIED SEASONALITY, UNSPECIFIED TRIGGER: ICD-10-CM

## 2020-04-16 DIAGNOSIS — Z12.31 ENCOUNTER FOR SCREENING MAMMOGRAM FOR BREAST CANCER: ICD-10-CM

## 2020-04-16 DIAGNOSIS — M54.2 MUSCULOSKELETAL NECK PAIN: Primary | ICD-10-CM

## 2020-04-16 DIAGNOSIS — G47.09 OTHER INSOMNIA: ICD-10-CM

## 2020-04-16 DIAGNOSIS — R00.2 PALPITATIONS: ICD-10-CM

## 2020-04-16 PROCEDURE — 99214 OFFICE O/P EST MOD 30 MIN: CPT | Performed by: INTERNAL MEDICINE

## 2020-04-16 NOTE — PATIENT INSTRUCTIONS
For the lump in throat, it sounds like allergies-try Mucinex 1200 mg twice a day and salt water in the nose 3 times a day.  May also use Flonase in the nose daily and Allegra or Claritin every day.   For neck pain, need heat, massage, gentle stretches, neck pillow.  Call if symptoms persist-may need x-ray and physical therapy.   For palpitations-will have further evaluation if worsening.   For the stress of caring for your grandchild and the stress of COVID-19-need stress reduction techniques with exercise, meditation, yoga, etc.   For hypothyroidism-recheck labs this year.   For insomnia-may use Tylenol PM (she is used this before without problems).   Your headaches may be from neck tightness or from allergies-treat both and call if your symptoms persist.

## 2020-04-16 NOTE — TELEPHONE ENCOUNTER
Pt called and stated she is having Tightness in her throat that she believes is anxiety related. Pt also would like to know if Dr Nunez saw the Cat scan results from January that was preformed in [place]. Pt is complaining of headaches again     Please advise    [phone]

## 2020-04-16 NOTE — PROGRESS NOTES
Chavez Pineda is a 59 y.o. female here for   Chief Complaint   Patient presents with   • Neck Pain     worse -but no weakness or numbness,etc   • Palpitations     off & on   • Headache     mild/occ   • Anxiety     caring for 1 wk old grandchild   • Insomnia     worried about Covid-19   .    There were no vitals filed for this visit.    There is no height or weight on file to calculate BMI.    Neck Pain    This is a recurrent problem. The current episode started more than 1 year ago. The problem occurs intermittently. The problem has been gradually worsening. The pain is associated with nothing. The pain is present in the occipital region. The quality of the pain is described as aching. The pain is moderate. Associated symptoms include chest pain (upper chest on both sides feels tight) and headaches (mild,occ). Pertinent negatives include no fever, numbness, tingling, trouble swallowing or visual change.   Headache    This is a recurrent problem. The current episode started more than 1 month ago. The problem has been waxing and waning. The pain does not radiate. The quality of the pain is described as aching. Associated symptoms include insomnia and neck pain. Pertinent negatives include no coughing, ear pain, fever, numbness, sore throat (feels like a lump in throat), tingling, tinnitus or visual change.   Anxiety   Presents for follow-up visit. Symptoms include chest pain (upper chest on both sides feels tight), insomnia, nervous/anxious behavior and palpitations. Patient reports no shortness of breath. Symptoms occur most days. The severity of symptoms is moderate.       Insomnia   This is a recurrent problem. The current episode started more than 1 month ago. The problem occurs intermittently. Associated symptoms include chest pain (upper chest on both sides feels tight), headaches (mild,occ) and neck pain. Pertinent negatives include no chills, congestion, coughing, fatigue, fever, numbness, sore  throat (feels like a lump in throat) or visual change.   Palpitations    This is a recurrent problem. The current episode started more than 1 year ago. The problem occurs intermittently. The problem has been waxing and waning. Associated symptoms include anxiety and chest pain (upper chest on both sides feels tight). Pertinent negatives include no coughing, fever, numbness or shortness of breath.        The following portions of the patient's history were reviewed and updated as appropriate: allergies, current medications, past social history and problem list.    Review of Systems   Constitutional: Negative for chills, fatigue and fever.   HENT: Positive for postnasal drip. Negative for congestion, ear pain, sinus pain, sneezing, sore throat (feels like a lump in throat), tinnitus, trouble swallowing and voice change.    Respiratory: Negative for cough, shortness of breath and wheezing.    Cardiovascular: Positive for chest pain (upper chest on both sides feels tight) and palpitations. Negative for leg swelling.   Musculoskeletal: Positive for neck pain and neck stiffness.   Neurological: Positive for headaches (mild,occ). Negative for tingling and numbness.   Psychiatric/Behavioral: Positive for sleep disturbance. Negative for dysphoric mood. The patient is nervous/anxious and has insomnia.        Objective   Physical Exam   Constitutional: She is oriented to person, place, and time. She appears well-developed and well-nourished. No distress.   HENT:   Head: Normocephalic and atraumatic.   Musculoskeletal: Normal range of motion.   Neurological: She is alert and oriented to person, place, and time. No cranial nerve deficit. Coordination normal.   Skin: She is not diaphoretic.   Psychiatric: She has a normal mood and affect. Her behavior is normal. Judgment and thought content normal.       Assessment/Plan   Diagnoses and all orders for this visit:    Musculoskeletal neck pain  Comments:  Call if symptoms  persist    Allergic rhinitis, unspecified seasonality, unspecified trigger  Comments:  She feels a lump in her throat which she needs to cough up-need Mucinex twice a day and salt water in the nose 3 times a day.    Encounter for screening mammogram for breast cancer  -     Mammo Screening Bilateral With CAD; Future    Palpitations    Acquired hypothyroidism  Comments:  Need repeat labs in several months    Other insomnia     Video visit for 25 minutes.     Reviewed hospital results from January 2020 ([place]). CT of head ok.  She had SVT from dehydration? - she had syncope then but no problems since then.

## 2020-04-20 ENCOUNTER — TRANSCRIBE ORDERS (OUTPATIENT)
Dept: ADMINISTRATIVE | Facility: HOSPITAL | Age: 60
End: 2020-04-20

## 2020-04-20 DIAGNOSIS — Z12.31 VISIT FOR SCREENING MAMMOGRAM: Primary | ICD-10-CM

## 2020-06-05 ENCOUNTER — PATIENT MESSAGE (OUTPATIENT)
Dept: INTERNAL MEDICINE | Facility: CLINIC | Age: 60
End: 2020-06-05

## 2020-06-10 NOTE — TELEPHONE ENCOUNTER
Kathy Nunez MD 6/8/2020 12:19 PM EDT    Please work her in sooner for CPE.  ----- Message -----  From: Pascale Luis MA  Sent: 6/8/2020 12:07 PM EDT  To: Kathy Nunez MD  Subject: FW: Test Results Question         ----- Message -----  From: Deb Pineda  Sent: 6/8/2020 12:02 PM EDT  To: [facility]  Subject: RE: Test Results Question      I’ve been going to physical therapy for about a month. It is a little better but still hurts in my head. That would be great if I could come sooner for my physical. I am out of town Chery 15-18 and July 18-25. Thanks  ----- Message -----  From: Kathy Nunez MD  Sent: 6/8/2020 11:30 AM EDT  To: Deb Pineda  Subject: RE: Test Results Question  I see the radiologist's reading of the head CT from 1/27/2020. I will ask our radiology dept if we have received the head CT, but their radiologist reading should be accurate. Are you still having problems? I can see you sooner than august if you are having problems.       ----- Message -----   From: Deb Pineda   Sent: 6/5/2020 6:43 PM EDT   To: Kathy Nunez MD  Subject: Test Results Question    Just checking to see if the [facility] sent the cd of my CT scan and if Dr Nunez has looked at it. Thanks, Deb Pineda

## 2020-06-22 ENCOUNTER — HOSPITAL ENCOUNTER (OUTPATIENT)
Dept: MAMMOGRAPHY | Facility: HOSPITAL | Age: 60
Discharge: HOME | End: 2020-06-22
Admitting: INTERNAL MEDICINE

## 2020-06-22 ENCOUNTER — APPOINTMENT (OUTPATIENT)
Dept: MAMMOGRAPHY | Facility: HOSPITAL | Age: 60
End: 2020-06-22

## 2020-06-22 DIAGNOSIS — Z12.31 VISIT FOR SCREENING MAMMOGRAM: ICD-10-CM

## 2020-06-22 PROCEDURE — 77063 BREAST TOMOSYNTHESIS BI: CPT

## 2020-06-22 PROCEDURE — 77067 SCR MAMMO BI INCL CAD: CPT

## 2020-06-24 ENCOUNTER — OFFICE VISIT (OUTPATIENT)
Dept: CARDIOLOGY | Facility: CLINIC | Age: 60
End: 2020-06-24

## 2020-06-24 VITALS
HEIGHT: 67 IN | HEART RATE: 58 BPM | WEIGHT: 160 LBS | DIASTOLIC BLOOD PRESSURE: 76 MMHG | BODY MASS INDEX: 25.11 KG/M2 | SYSTOLIC BLOOD PRESSURE: 122 MMHG

## 2020-06-24 DIAGNOSIS — I47.1 PSVT (PAROXYSMAL SUPRAVENTRICULAR TACHYCARDIA) (HCC): Primary | ICD-10-CM

## 2020-06-24 DIAGNOSIS — R07.89 CHEST PAIN, ATYPICAL: ICD-10-CM

## 2020-06-24 PROCEDURE — 99214 OFFICE O/P EST MOD 30 MIN: CPT | Performed by: INTERNAL MEDICINE

## 2020-06-24 NOTE — PROGRESS NOTES
[facility]      Patient Name: Deb Pineda  :1960  Age: 59 y.o.  Encounter Provider:  Genaro Jain Jr, MD      Chief Complaint:   Chief Complaint   Patient presents with   • Loss of Consciousness     3 month followup         HPI  Deb Pienda is a 59 y.o. female past medical history of hypothyroidism who presents for follow-up evaluation of syncope.      Last clinic visit note: Patient was in [place] in the end of January and felt slightly dizzy when sitting up from a lying position.  She stood up to go to the bathroom and fell hitting her head on the counter.  She states that her legs just gave out and does not think that she lost consciousness.  She walked in to see her  in the kitchen and when he was looking at the injury on her head her eyes rolled backwards and she passed out.  It was only for a few seconds and there was no convulsive activity.  There was no loss of bowel or bladder continence.  When she awoke she did not feel postictal.  She had some dizziness still but no chest pain, shortness of air or palpitations.  Although she did not notice palpitations with this episode she does have occasional palpitations which lasts for seconds to minutes.  She is very active and does not have any limiting symptoms.  They did a Holter monitor which showed frequent supraventricular ectopy.  The patient did not keep a diary during this monitoring period as she was not told to do so.  Echocardiogram was performed showing normal left ventricular ejection fraction and no significant valvular heart disease.  She has had no recurrent syncope but does note occasional positional dizziness.  She is a lifelong non-smoker who drinks occasionally and denies illicit drug use.  She denies any fevers chills cough or otherwise infectious prodrome.  Both parents were noted to have atrial fibrillation.  She also has a brother who  of ruptured aortic aneurysm but she is unsure of whether or  "not this was thoracic versus abdominal.  She had her TSH checked in  of last year which was within normal limits but no recent check since then.      Extended ambulatory telemetry was performed showing frequent episodes of SVT that were very fast with heart rates going up into the 200s.  Restarted on low-dose atenolol and she is done well with this.  Significant decrease in the frequency of palpitations although she continues to have them nightly.  Resting heart rate today 58 bpm.  No dizziness or syncope.  She remains active without any angina or heart failure.  She was able to discuss past family history with other members and it is been confirmed that her brother  of a thoracic aortic aneurysm rupture.    The following portions of the patient's history were reviewed and updated as appropriate: allergies, current medications, past family history, past medical history, past social history, past surgical history and problem list.      Review of Systems   Constitution: Negative for chills and fever.   HENT: Negative for hoarse voice and sore throat.    Eyes: Negative for double vision and photophobia.   Cardiovascular: Positive for palpitations. Negative for chest pain, leg swelling, near-syncope, orthopnea, paroxysmal nocturnal dyspnea and syncope.   Respiratory: Negative for cough and wheezing.    Skin: Negative for poor wound healing and rash.   Musculoskeletal: Negative for arthritis and joint swelling.   Gastrointestinal: Negative for bloating, abdominal pain, hematemesis and hematochezia.   Neurological: Positive for dizziness. Negative for focal weakness.   Psychiatric/Behavioral: Negative for depression and suicidal ideas.   All other systems reviewed and are negative.      OBJECTIVE:   Vital Signs  Vitals:    20 1305   BP: 122/76   Pulse: 58     Estimated body mass index is 25.06 kg/m² as calculated from the following:    Height as of this encounter: 170.2 cm (67\").    Weight as of this " encounter: 72.6 kg (160 lb).    Physical Exam   Constitutional: She is oriented to person, place, and time. She appears well-developed and well-nourished.   HENT:   Head: Normocephalic and atraumatic.   Eyes: Pupils are equal, round, and reactive to light. Conjunctivae are normal.   Neck: No JVD present. No thyromegaly present.   Cardiovascular: Exam reveals no gallop and no friction rub.   No murmur heard.  Pulmonary/Chest: No respiratory distress. She exhibits no tenderness.   Abdominal: Bowel sounds are normal. She exhibits no distension.   Musculoskeletal: She exhibits no edema or tenderness.   Neurological: She is alert and oriented to person, place, and time.   Skin: No rash noted. No erythema.   Psychiatric: She has a normal mood and affect. Judgment normal.   Vitals reviewed.    Physical exam was reviewed, updated and amended when necessary.    Procedures          ASSESSMENT:      Diagnosis Plan   1. PSVT (paroxysmal supraventricular tachycardia) (CMS/[facility])  Holter Monitor - 24 Hour   2. Chest pain, atypical  CT Chest Without Contrast         PLAN OF CARE:     1. PSVT -over 900 episodes of SVT on ZIO Patch.  She is improved frequency but continues to have frequent palpitations.  Resting heart rate is borderline low.  Check 24-hour Holter to make sure there is no bradycardia arrhythmia in room to increase dosing of atenolol.  2. Chest discomfort -I believe this is related to her complaints of palpitations however given family history of ruptured thoracic aortic aneurysm we will check a CT chest without contrast.  3. Hypothyroidism -TSH within normal range.    Return to clinic 6 months             Discharge Medications           Accurate as of June 24, 2020  2:00 PM. If you have any questions, ask your nurse or doctor.               Continue These Medications      Instructions Start Date   atenolol 25 MG tablet  Commonly known as:  TENORMIN   25 mg, Oral, Daily      levothyroxine 50 MCG tablet  Commonly known  as:  SYNTHROID, LEVOTHROID   50 mcg, Oral, Daily      MULTI VITAMIN DAILY PO   1 tablet, Oral, Daily             Thank you for allowing me to participate in the care of your patient,      Sincerely,   Genaro Jain MD  [facility]  06/24/20  14:00

## 2020-06-29 ENCOUNTER — TELEPHONE (OUTPATIENT)
Dept: CARDIOLOGY | Facility: CLINIC | Age: 60
End: 2020-06-29

## 2020-07-01 ENCOUNTER — HOSPITAL ENCOUNTER (OUTPATIENT)
Dept: CT IMAGING | Facility: HOSPITAL | Age: 60
Discharge: HOME | End: 2020-07-01
Admitting: INTERNAL MEDICINE

## 2020-07-01 DIAGNOSIS — R07.89 CHEST PAIN, ATYPICAL: ICD-10-CM

## 2020-07-01 PROCEDURE — 71250 CT THORAX DX C-: CPT

## 2020-07-03 DIAGNOSIS — E03.9 ACQUIRED HYPOTHYROIDISM: ICD-10-CM

## 2020-07-06 ENCOUNTER — OFFICE VISIT (OUTPATIENT)
Dept: INTERNAL MEDICINE | Facility: CLINIC | Age: 60
End: 2020-07-06

## 2020-07-06 VITALS
WEIGHT: 160 LBS | HEIGHT: 67 IN | RESPIRATION RATE: 14 BRPM | DIASTOLIC BLOOD PRESSURE: 68 MMHG | SYSTOLIC BLOOD PRESSURE: 120 MMHG | BODY MASS INDEX: 25.11 KG/M2

## 2020-07-06 DIAGNOSIS — Z12.11 COLON CANCER SCREENING: ICD-10-CM

## 2020-07-06 DIAGNOSIS — E78.5 HYPERLIPIDEMIA, ACQUIRED: ICD-10-CM

## 2020-07-06 DIAGNOSIS — J30.9 ALLERGIC RHINITIS, UNSPECIFIED SEASONALITY, UNSPECIFIED TRIGGER: ICD-10-CM

## 2020-07-06 DIAGNOSIS — Z12.4 CERVICAL CANCER SCREENING: ICD-10-CM

## 2020-07-06 DIAGNOSIS — R00.2 PALPITATIONS: ICD-10-CM

## 2020-07-06 DIAGNOSIS — M54.2 MUSCULOSKELETAL NECK PAIN: ICD-10-CM

## 2020-07-06 DIAGNOSIS — Z83.71 FAMILY HISTORY OF COLONIC POLYPS: ICD-10-CM

## 2020-07-06 DIAGNOSIS — E03.9 ACQUIRED HYPOTHYROIDISM: ICD-10-CM

## 2020-07-06 DIAGNOSIS — Z00.00 ANNUAL PHYSICAL EXAM: Primary | ICD-10-CM

## 2020-07-06 LAB — HEMOCCULT STL QL IA: NEGATIVE

## 2020-07-06 PROCEDURE — 82274 ASSAY TEST FOR BLOOD FECAL: CPT | Performed by: INTERNAL MEDICINE

## 2020-07-06 PROCEDURE — 99396 PREV VISIT EST AGE 40-64: CPT | Performed by: INTERNAL MEDICINE

## 2020-07-06 RX ORDER — LEVOTHYROXINE SODIUM 0.05 MG/1
TABLET ORAL
Qty: 90 TABLET | Refills: 2 | Status: SHIPPED | OUTPATIENT
Start: 2020-07-06 | End: 2021-03-30

## 2020-07-06 NOTE — PROGRESS NOTES
"Chavez Pineda is a 59 y.o. female here for   Chief Complaint   Patient presents with   • Annual Exam   .    Vitals:    07/06/20 1411   BP: 120/68   BP Location: Left arm   Patient Position: Sitting   Cuff Size: Adult   Resp: 14   Weight: 72.6 kg (160 lb)   Height: 170.2 cm (67\")       Body mass index is 25.06 kg/m².    History of Present Illness     The following portions of the patient's history were reviewed and updated as appropriate: allergies, current medications, past social history and problem list.    Review of Systems   Constitutional: Negative for appetite change, chills, fatigue, fever and unexpected weight change.   HENT: Negative for congestion, ear pain, mouth sores, postnasal drip, sore throat, tinnitus, trouble swallowing and voice change.    Eyes: Negative for pain and visual disturbance.   Respiratory: Negative for cough, choking, shortness of breath and wheezing.    Cardiovascular: Positive for palpitations (occ). Negative for chest pain and leg swelling.   Gastrointestinal: Negative for abdominal pain, blood in stool, constipation, diarrhea, nausea and vomiting.   Endocrine: Negative for cold intolerance, heat intolerance, polydipsia and polyuria.   Genitourinary: Positive for enuresis (1x). Negative for difficulty urinating, dysuria, flank pain, frequency, hematuria, urgency and vaginal bleeding.   Musculoskeletal: Positive for neck pain and neck stiffness (better with PT). Negative for arthralgias, back pain, gait problem, joint swelling and myalgias.   Skin: Negative for color change and rash.   Allergic/Immunologic: Positive for environmental allergies. Negative for food allergies and immunocompromised state.   Neurological: Positive for dizziness (better with PT). Negative for tremors, seizures, syncope, speech difficulty, weakness, numbness and headaches.   Hematological: Negative for adenopathy. Does not bruise/bleed easily.   Psychiatric/Behavioral: Negative for agitation, " confusion, decreased concentration, dysphoric mood, sleep disturbance and suicidal ideas. The patient is not nervous/anxious.        Objective   Physical Exam   Constitutional: She appears well-developed and well-nourished.   HENT:   Right Ear: Hearing, tympanic membrane, external ear and ear canal normal.   Left Ear: Hearing, tympanic membrane, external ear and ear canal normal.   Nose: Right sinus exhibits no maxillary sinus tenderness and no frontal sinus tenderness. Left sinus exhibits no maxillary sinus tenderness and no frontal sinus tenderness.   Eyes: Pupils are equal, round, and reactive to light. Conjunctivae, EOM and lids are normal.   Neck: Trachea normal. Neck supple. No JVD present. Carotid bruit is not present. No tracheal deviation present. No thyroid mass and no thyromegaly present.   Cardiovascular: Normal rate, regular rhythm, S1 normal and S2 normal. Exam reveals no gallop and no friction rub.   No murmur heard.  Pulses:       Carotid pulses are 2+ on the right side, and 2+ on the left side.       Radial pulses are 2+ on the right side, and 2+ on the left side.        Dorsalis pedis pulses are 2+ on the right side, and 2+ on the left side.        Posterior tibial pulses are 2+ on the right side, and 2+ on the left side.   Pulmonary/Chest: Effort normal and breath sounds normal. Chest wall is not dull to percussion. Right breast exhibits no inverted nipple, no mass, no nipple discharge, no skin change and no tenderness. Left breast exhibits no inverted nipple, no mass, no nipple discharge, no skin change and no tenderness.   Abdominal: Soft. Normal aorta and bowel sounds are normal. She exhibits no abdominal bruit. There is no hepatosplenomegaly. There is no tenderness. There is no rebound and no guarding. No hernia.   Genitourinary: Rectum normal, vagina normal and uterus normal. Rectal exam shows guaiac negative stool. No labial fusion. There is no rash, tenderness, lesion or injury on the right  labia. There is no rash, tenderness, lesion or injury on the left labia. Cervix exhibits no discharge. Right adnexum displays no mass, no tenderness and no fullness. Left adnexum displays no mass, no tenderness and no fullness.   Musculoskeletal: Normal range of motion. She exhibits no edema.   Lymphadenopathy:     She has no cervical adenopathy.     She has no axillary adenopathy.        Right: No supraclavicular adenopathy present.        Left: No supraclavicular adenopathy present.   Neurological: She is alert. She has normal strength. No cranial nerve deficit or sensory deficit. She displays a negative Romberg sign.   Reflex Scores:       Patellar reflexes are 2+ on the right side and 2+ on the left side.  Skin: Skin is warm and dry.   Nursing note and vitals reviewed.      Assessment/Plan   Diagnoses and all orders for this visit:    Annual physical exam  -     CBC & Differential; Future  -     Comprehensive Metabolic Panel; Future  -     Lipid Panel; Future  -     Urinalysis With Microscopic If Indicated (No Culture) - Urine, Clean Catch; Future    Palpitations  Comments:  b/c SVT on holter monitor - ok per cardiology  Orders:  -     TSH Rfx On Abnormal To Free T4; Future    Allergic rhinitis, unspecified seasonality, unspecified trigger  Comments:  improved -call if worse    Cervical cancer screening  -     Pap IG, HPV-hr    Colon cancer screening  -     POC FECAL OCCULT BLOOD BY IMMUNOASSAY    Hyperlipidemia, acquired  Comments:  continue diet/ex    Acquired hypothyroidism  Comments:  need rechk TSH    Musculoskeletal neck pain    Family history of colonic polyps  Comments:  need c-scope next year     Discussed need to stay up to date on screening exams as indicated on sex, age & risk factors. I encouraged healthy weight maintenance with diet & exercise. Need good sleep habits & continue to avoid tobacco & excessive alcohol.     Return for fasting labs.

## 2020-07-07 ENCOUNTER — TELEPHONE (OUTPATIENT)
Dept: CARDIOLOGY | Facility: CLINIC | Age: 60
End: 2020-07-07

## 2020-07-07 DIAGNOSIS — R07.89 CHEST PAIN, ATYPICAL: Primary | ICD-10-CM

## 2020-07-07 RX ORDER — ATENOLOL 25 MG/1
TABLET ORAL
Qty: 30 TABLET | Refills: 5 | Status: SHIPPED | OUTPATIENT
Start: 2020-07-07 | End: 2021-01-12 | Stop reason: SDUPTHER

## 2020-07-07 NOTE — TELEPHONE ENCOUNTER
Spoke to patient about CT chest with no evidence of thoracic aortic aneurysm.  However there were vascular calcifications in the distribution of the coronary arteries which would indicate atherosclerotic burden.  Patient continues to have atypical chest pain.  We will plan for an exercise nuclear stress study.

## 2020-07-09 LAB
CYTOLOGIST CVX/VAG CYTO: NORMAL
CYTOLOGY CVX/VAG DOC CYTO: NORMAL
CYTOLOGY CVX/VAG DOC THIN PREP: NORMAL
DX ICD CODE: NORMAL
HIV 1 & 2 AB SER-IMP: NORMAL
HPV I/H RISK 1 DNA CVX QL PROBE+SIG AMP: NEGATIVE
Lab: NORMAL
OTHER STN SPEC: NORMAL
REF LAB TEST METHOD: NORMAL
STAT OF ADQ CVX/VAG CYTO-IMP: NORMAL

## 2020-07-10 ENCOUNTER — HOSPITAL ENCOUNTER (OUTPATIENT)
Dept: CARDIOLOGY | Facility: HOSPITAL | Age: 60
Discharge: HOME | End: 2020-07-10
Admitting: INTERNAL MEDICINE

## 2020-07-10 VITALS — BODY MASS INDEX: 25.12 KG/M2 | HEIGHT: 67 IN | WEIGHT: 160.05 LBS

## 2020-07-10 DIAGNOSIS — R07.89 CHEST PAIN, ATYPICAL: ICD-10-CM

## 2020-07-10 LAB
BH CV NUCLEAR PRIOR STUDY: 3
BH CV STRESS BP STAGE 1: NORMAL
BH CV STRESS BP STAGE 2: NORMAL
BH CV STRESS DURATION MIN STAGE 1: 3
BH CV STRESS DURATION MIN STAGE 2: 3
BH CV STRESS DURATION MIN STAGE 3: 1
BH CV STRESS DURATION SEC STAGE 1: 0
BH CV STRESS DURATION SEC STAGE 2: 0
BH CV STRESS DURATION SEC STAGE 3: 30
BH CV STRESS GRADE STAGE 1: 10
BH CV STRESS GRADE STAGE 2: 12
BH CV STRESS GRADE STAGE 3: 14
BH CV STRESS HR STAGE 1: 88
BH CV STRESS HR STAGE 2: 141
BH CV STRESS HR STAGE 3: 150
BH CV STRESS METS STAGE 1: 5
BH CV STRESS METS STAGE 2: 7.5
BH CV STRESS METS STAGE 3: 10
BH CV STRESS PROTOCOL 1: NORMAL
BH CV STRESS RECOVERY BP: NORMAL MMHG
BH CV STRESS RECOVERY HR: 76 BPM
BH CV STRESS SPEED STAGE 1: 1.7
BH CV STRESS SPEED STAGE 2: 2.5
BH CV STRESS SPEED STAGE 3: 3.4
BH CV STRESS STAGE 1: 1
BH CV STRESS STAGE 2: 2
BH CV STRESS STAGE 3: 3
LV EF NUC BP: 69 %
MAXIMAL PREDICTED HEART RATE: 160 BPM
PERCENT MAX PREDICTED HR: 93.75 %
STRESS BASELINE BP: NORMAL MMHG
STRESS BASELINE HR: 60 BPM
STRESS PERCENT HR: 110 %
STRESS POST ESTIMATED WORKLOAD: 9 METS
STRESS POST EXERCISE DUR MIN: 7 MIN
STRESS POST EXERCISE DUR SEC: 30 SEC
STRESS POST PEAK BP: NORMAL MMHG
STRESS POST PEAK HR: 150 BPM
STRESS TARGET HR: 136 BPM

## 2020-07-10 PROCEDURE — A9502 TC99M TETROFOSMIN: HCPCS | Performed by: INTERNAL MEDICINE

## 2020-07-10 PROCEDURE — 78452 HT MUSCLE IMAGE SPECT MULT: CPT | Performed by: INTERNAL MEDICINE

## 2020-07-10 PROCEDURE — 93016 CV STRESS TEST SUPVJ ONLY: CPT | Performed by: INTERNAL MEDICINE

## 2020-07-10 PROCEDURE — 0 TECHNETIUM TETROFOSMIN KIT: Performed by: INTERNAL MEDICINE

## 2020-07-10 PROCEDURE — 78452 HT MUSCLE IMAGE SPECT MULT: CPT

## 2020-07-10 PROCEDURE — 93017 CV STRESS TEST TRACING ONLY: CPT

## 2020-07-10 PROCEDURE — 93018 CV STRESS TEST I&R ONLY: CPT | Performed by: INTERNAL MEDICINE

## 2020-07-10 RX ADMIN — TETROFOSMIN 1 DOSE: 1.38 INJECTION, POWDER, LYOPHILIZED, FOR SOLUTION INTRAVENOUS at 09:15

## 2020-07-10 RX ADMIN — TETROFOSMIN 1 DOSE: 1.38 INJECTION, POWDER, LYOPHILIZED, FOR SOLUTION INTRAVENOUS at 08:15

## 2020-08-06 ENCOUNTER — LAB (OUTPATIENT)
Dept: INTERNAL MEDICINE | Facility: CLINIC | Age: 60
End: 2020-08-06

## 2020-08-06 DIAGNOSIS — Z00.00 ANNUAL PHYSICAL EXAM: ICD-10-CM

## 2020-08-06 DIAGNOSIS — R00.2 PALPITATIONS: ICD-10-CM

## 2020-08-06 LAB
ALBUMIN SERPL-MCNC: 4.3 G/DL (ref 3.5–5.2)
ALBUMIN/GLOB SERPL: 2 G/DL
ALP SERPL-CCNC: 56 U/L (ref 39–117)
ALT SERPL-CCNC: 22 U/L (ref 1–33)
APPEARANCE UR: CLEAR
AST SERPL-CCNC: 23 U/L (ref 1–32)
BASOPHILS # BLD AUTO: 0.06 10*3/MM3 (ref 0–0.2)
BASOPHILS NFR BLD AUTO: 0.9 % (ref 0–1.5)
BILIRUB SERPL-MCNC: 0.3 MG/DL (ref 0–1.2)
BILIRUB UR QL STRIP: NEGATIVE
BUN SERPL-MCNC: 12 MG/DL (ref 8–23)
BUN/CREAT SERPL: 12.4 (ref 7–25)
CALCIUM SERPL-MCNC: 9.4 MG/DL (ref 8.6–10.5)
CHLORIDE SERPL-SCNC: 102 MMOL/L (ref 98–107)
CHOLEST SERPL-MCNC: 180 MG/DL (ref 0–200)
CO2 SERPL-SCNC: 30.5 MMOL/L (ref 22–29)
COLOR UR: NORMAL
CREAT SERPL-MCNC: 0.97 MG/DL (ref 0.57–1)
EOSINOPHIL # BLD AUTO: 0.06 10*3/MM3 (ref 0–0.4)
EOSINOPHIL NFR BLD AUTO: 0.9 % (ref 0.3–6.2)
ERYTHROCYTE [DISTWIDTH] IN BLOOD BY AUTOMATED COUNT: 11.5 % (ref 12.3–15.4)
GLOBULIN SER CALC-MCNC: 2.1 GM/DL
GLUCOSE SERPL-MCNC: 88 MG/DL (ref 65–99)
GLUCOSE UR QL: NEGATIVE
HCT VFR BLD AUTO: 40.9 % (ref 34–46.6)
HDLC SERPL-MCNC: 53 MG/DL (ref 40–60)
HGB BLD-MCNC: 13.3 G/DL (ref 12–15.9)
HGB UR QL STRIP: NEGATIVE
IMM GRANULOCYTES # BLD AUTO: 0.02 10*3/MM3 (ref 0–0.05)
IMM GRANULOCYTES NFR BLD AUTO: 0.3 % (ref 0–0.5)
KETONES UR QL STRIP: NEGATIVE
LDLC SERPL CALC-MCNC: 107 MG/DL (ref 0–100)
LEUKOCYTE ESTERASE UR QL STRIP: NEGATIVE
LYMPHOCYTES # BLD AUTO: 1.66 10*3/MM3 (ref 0.7–3.1)
LYMPHOCYTES NFR BLD AUTO: 25.2 % (ref 19.6–45.3)
MCH RBC QN AUTO: 30.9 PG (ref 26.6–33)
MCHC RBC AUTO-ENTMCNC: 32.5 G/DL (ref 31.5–35.7)
MCV RBC AUTO: 94.9 FL (ref 79–97)
MONOCYTES # BLD AUTO: 0.43 10*3/MM3 (ref 0.1–0.9)
MONOCYTES NFR BLD AUTO: 6.5 % (ref 5–12)
NEUTROPHILS # BLD AUTO: 4.35 10*3/MM3 (ref 1.7–7)
NEUTROPHILS NFR BLD AUTO: 66.2 % (ref 42.7–76)
NITRITE UR QL STRIP: NEGATIVE
NRBC BLD AUTO-RTO: 0 /100 WBC (ref 0–0.2)
PH UR STRIP: 6.5 [PH] (ref 5–8)
PLATELET # BLD AUTO: 244 10*3/MM3 (ref 140–450)
POTASSIUM SERPL-SCNC: 4.6 MMOL/L (ref 3.5–5.2)
PROT SERPL-MCNC: 6.4 G/DL (ref 6–8.5)
PROT UR QL STRIP: NEGATIVE
RBC # BLD AUTO: 4.31 10*6/MM3 (ref 3.77–5.28)
SODIUM SERPL-SCNC: 141 MMOL/L (ref 136–145)
SP GR UR: 1.02 (ref 1–1.03)
TRIGL SERPL-MCNC: 98 MG/DL (ref 0–150)
TSH SERPL DL<=0.005 MIU/L-ACNC: 2.33 UIU/ML (ref 0.27–4.2)
UROBILINOGEN UR STRIP-MCNC: NORMAL MG/DL
VLDLC SERPL CALC-MCNC: 19.6 MG/DL
WBC # BLD AUTO: 6.58 10*3/MM3 (ref 3.4–10.8)

## 2020-12-29 ENCOUNTER — OFFICE VISIT (OUTPATIENT)
Dept: CARDIOLOGY | Facility: CLINIC | Age: 60
End: 2020-12-29

## 2020-12-29 VITALS
HEIGHT: 67 IN | RESPIRATION RATE: 16 BRPM | SYSTOLIC BLOOD PRESSURE: 128 MMHG | OXYGEN SATURATION: 98 % | DIASTOLIC BLOOD PRESSURE: 76 MMHG | WEIGHT: 173 LBS | BODY MASS INDEX: 27.15 KG/M2 | HEART RATE: 54 BPM

## 2020-12-29 DIAGNOSIS — I47.1 PSVT (PAROXYSMAL SUPRAVENTRICULAR TACHYCARDIA) (HCC): Primary | ICD-10-CM

## 2020-12-29 PROCEDURE — 99214 OFFICE O/P EST MOD 30 MIN: CPT | Performed by: INTERNAL MEDICINE

## 2020-12-29 NOTE — PROGRESS NOTES
[facility]      Patient Name: Deb Pineda  :1960  Age: 60 y.o.  Encounter Provider:  Genaro Jain Jr, MD      Chief Complaint:   Chief Complaint   Patient presents with   • Rapid Heart Rate     6 month follow up for Atypical Chest pain and PSVT          HPI  Deb Pineda is a 60 y.o. female past medical history of hypothyroidism who presents for follow-up evaluation of syncope.      Last clinic visit note: Patient was in [place] in the end of January and felt slightly dizzy when sitting up from a lying position.  She stood up to go to the bathroom and fell hitting her head on the counter.  She states that her legs just gave out and does not think that she lost consciousness.  She walked in to see her  in the kitchen and when he was looking at the injury on her head her eyes rolled backwards and she passed out.  It was only for a few seconds and there was no convulsive activity.  There was no loss of bowel or bladder continence.  When she awoke she did not feel postictal.  She had some dizziness still but no chest pain, shortness of air or palpitations.  Although she did not notice palpitations with this episode she does have occasional palpitations which lasts for seconds to minutes.  She is very active and does not have any limiting symptoms.  They did a Holter monitor which showed frequent supraventricular ectopy.  The patient did not keep a diary during this monitoring period as she was not told to do so.  Echocardiogram was performed showing normal left ventricular ejection fraction and no significant valvular heart disease.  She has had no recurrent syncope but does note occasional positional dizziness.  She is a lifelong non-smoker who drinks occasionally and denies illicit drug use.  She denies any fevers chills cough or otherwise infectious prodrome.  Both parents were noted to have atrial fibrillation.  She also has a brother who  of ruptured aortic aneurysm  but she is unsure of whether or not this was thoracic versus abdominal.  She had her TSH checked in  of last year which was within normal limits but no recent check since then.      Extended ambulatory telemetry was performed showing frequent episodes of SVT that were very fast with heart rates going up into the 200s.  Restarted on low-dose atenolol and she is done well with this.  Significant decrease in the frequency of palpitations although she continues to have them nightly.  Resting heart rate today 58 bpm.  No dizziness or syncope.  She remains active without any angina or heart failure.  She was able to discuss past family history with other members and it is been confirmed that her brother  of a thoracic aortic aneurysm rupture.    Given her family history of thoracic aortic aneurysm rupture a CT without contrast was performed showing normal caliber thoracic aorta.  Vascular calcifications were noted within the coronary distribution and given recurrent atypical chest pain a stress study was performed showing no evidence of myocardial ischemia.  Since starting low-dose metoprolol she is done well.  She remains active without limiting symptoms.  She has occasional palpitations that last anywhere from 15 to 30 seconds usually at night.  She does not have symptoms when she is exercising.  She is tolerating medications well.  Social and family history reviewed and not pertinent to this clinic visit.    The following portions of the patient's history were reviewed and updated as appropriate: allergies, current medications, past family history, past medical history, past social history, past surgical history and problem list.      Review of Systems   Constitution: Negative for chills and fever.   HENT: Negative for hoarse voice and sore throat.    Eyes: Negative for double vision and photophobia.   Cardiovascular: Negative for chest pain, leg swelling, near-syncope, orthopnea, palpitations, paroxysmal  "nocturnal dyspnea and syncope.   Respiratory: Negative for cough and wheezing.    Skin: Negative for itching, poor wound healing and rash.   Musculoskeletal: Negative for arthritis, joint pain and joint swelling.   Gastrointestinal: Negative for bloating, abdominal pain, hematemesis and hematochezia.   Neurological: Negative for dizziness and focal weakness.   Psychiatric/Behavioral: Negative for altered mental status, depression and suicidal ideas.   All other systems reviewed and are negative.    ROS was reviewed, updated and amended when necessary.    OBJECTIVE:   Vital Signs  There were no vitals filed for this visit.  Estimated body mass index is 25.06 kg/m² as calculated from the following:    Height as of 7/10/20: 170.2 cm (67.01\").    Weight as of 7/10/20: 72.6 kg (160 lb 0.9 oz).    Physical Exam   Constitutional: She is oriented to person, place, and time. She appears well-developed and well-nourished.   HENT:   Head: Normocephalic and atraumatic.   Eyes: Pupils are equal, round, and reactive to light. Conjunctivae are normal.   Neck: No JVD present. No thyromegaly present.   Cardiovascular: Exam reveals no gallop and no friction rub.   No murmur heard.  Pulmonary/Chest: No respiratory distress. She exhibits no tenderness.   Abdominal: Bowel sounds are normal. She exhibits no distension.   Musculoskeletal:         General: No tenderness or edema.   Neurological: She is alert and oriented to person, place, and time.   Skin: No rash noted. No erythema.   Psychiatric: She has a normal mood and affect. Judgment normal.   Vitals reviewed.    Physical exam was reviewed, updated amended when necessary.    Procedures          ASSESSMENT:     Atypical chest pain  PSVT  Hypothyroidism    PLAN OF CARE:     1. PSVT -over 900 episodes of SVT on ZIO Patch.  She is improved frequency but continues to have frequent palpitations.  Repeat telemetry monitor shows no more than short bursts of SVT.  She is tolerating " beta-blocker well.  Continue same.  2. Chest discomfort -no evidence of thoracic aortic aneurysm and no evidence of myocardial ischemia.  Mild vascular calcifications were noted in the coronary distribution but the patient has well-controlled lipids and her ASCVD is 3.5%.  No indication for starting aspirin at this time.  3. Hypothyroidism -TSH within normal range.    Return to clinic 12 months             Discharge Medications          Accurate as of December 29, 2020 11:45 AM. If you have any questions, ask your nurse or doctor.            Continue These Medications      Instructions Start Date   atenolol 25 MG tablet  Commonly known as: TENORMIN   TAKE ONE TABLET BY MOUTH DAILY      levothyroxine 50 MCG tablet  Commonly known as: SYNTHROID, LEVOTHROID   TAKE ONE TABLET BY MOUTH DAILY      multivitamin tablet tablet  Commonly known as: THERAGRAN   1 tablet, Oral, Daily             Thank you for allowing me to participate in the care of your patient,      Sincerely,   Genaro Jain MD  [facility]  12/29/20  11:45 EST

## 2021-01-07 ENCOUNTER — OFFICE VISIT (OUTPATIENT)
Dept: INTERNAL MEDICINE | Facility: CLINIC | Age: 61
End: 2021-01-07

## 2021-01-07 VITALS
HEART RATE: 71 BPM | TEMPERATURE: 98.2 F | HEIGHT: 67 IN | DIASTOLIC BLOOD PRESSURE: 70 MMHG | WEIGHT: 168.2 LBS | BODY MASS INDEX: 26.4 KG/M2 | OXYGEN SATURATION: 98 % | SYSTOLIC BLOOD PRESSURE: 110 MMHG

## 2021-01-07 DIAGNOSIS — E78.5 HYPERLIPIDEMIA, ACQUIRED: Primary | ICD-10-CM

## 2021-01-07 DIAGNOSIS — E03.9 ACQUIRED HYPOTHYROIDISM: ICD-10-CM

## 2021-01-07 PROCEDURE — 99213 OFFICE O/P EST LOW 20 MIN: CPT | Performed by: INTERNAL MEDICINE

## 2021-01-07 NOTE — PATIENT INSTRUCTIONS
Mediterranean Diet  A Mediterranean diet refers to food and lifestyle choices that are based on the traditions of countries located on the [place]. This way of eating has been shown to help prevent certain conditions and improve outcomes for people who have chronic diseases, like kidney disease and heart disease.  What are tips for following this plan?  Lifestyle  · Cook and eat meals together with your family, when possible.  · Drink enough fluid to keep your urine clear or pale yellow.  · Be physically active every day. This includes:  ? Aerobic exercise like running or swimming.  ? Leisure activities like gardening, walking, or housework.  · Get 7-8 hours of sleep each night.  · If recommended by your health care provider, drink red wine in moderation. This means 1 glass a day for nonpregnant women and 2 glasses a day for men. A glass of wine equals 5 oz (150 mL).  Reading food labels    · Check the serving size of packaged foods. For foods such as rice and pasta, the serving size refers to the amount of cooked product, not dry.  · Check the total fat in packaged foods. Avoid foods that have saturated fat or trans fats.  · Check the ingredients list for added sugars, such as corn syrup.  Shopping  · At the grocery store, buy most of your food from the areas near the walls of the store. This includes:  ? Fresh fruits and vegetables (produce).  ? Grains, beans, nuts, and seeds. Some of these may be available in unpackaged forms or large amounts (in bulk).  ? Fresh seafood.  ? Poultry and eggs.  ? Low-fat dairy products.  · Buy whole ingredients instead of prepackaged foods.  · Buy fresh fruits and vegetables in-season from local farmers markets.  · Buy frozen fruits and vegetables in resealable bags.  · If you do not have access to quality fresh seafood, buy precooked frozen shrimp or canned fish, such as tuna, salmon, or sardines.  · Buy small amounts of raw or cooked vegetables, salads, or olives from  the deli or salad bar at your store.  · Stock your pantry so you always have certain foods on hand, such as olive oil, canned tuna, canned tomatoes, rice, pasta, and beans.  Cooking  · Cook foods with extra-virgin olive oil instead of using butter or other vegetable oils.  · Have meat as a side dish, and have vegetables or grains as your main dish. This means having meat in small portions or adding small amounts of meat to foods like pasta or stew.  · Use beans or vegetables instead of meat in common dishes like chili or lasagna.  · Tower with different cooking methods. Try roasting or broiling vegetables instead of steaming or sautéeing them.  · Add frozen vegetables to soups, stews, pasta, or rice.  · Add nuts or seeds for added healthy fat at each meal. You can add these to yogurt, salads, or vegetable dishes.  · Marinate fish or vegetables using olive oil, lemon juice, garlic, and fresh herbs.  Meal planning    · Plan to eat 1 vegetarian meal one day each week. Try to work up to 2 vegetarian meals, if possible.  · Eat seafood 2 or more times a week.  · Have healthy snacks readily available, such as:  ? Vegetable sticks with hummus.  ? Greek yogurt.  ? Fruit and nut trail mix.  · Eat balanced meals throughout the week. This includes:  ? Fruit: 2-3 servings a day  ? Vegetables: 4-5 servings a day  ? Low-fat dairy: 2 servings a day  ? Fish, poultry, or lean meat: 1 serving a day  ? Beans and legumes: 2 or more servings a week  ? Nuts and seeds: 1-2 servings a day  ? Whole grains: 6-8 servings a day  ? Extra-virgin olive oil: 3-4 servings a day  · Limit red meat and sweets to only a few servings a month  What are my food choices?  · Mediterranean diet  ? Recommended  § Grains: Whole-grain pasta. Brown rice. Bulgar wheat. Polenta. Couscous. Whole-wheat bread. Oatmeal. Quinoa.  § Vegetables: Artichokes. Beets. Broccoli. Cabbage. Carrots. Eggplant. Green beans. Chard. Kale. Spinach. Onions. Leeks. Peas. Squash.  Tomatoes. Peppers. Radishes.  § Fruits: Apples. Apricots. Avocado. Berries. Bananas. Cherries. Dates. Figs. Grapes. Josefa. Melon. Oranges. Peaches. Plums. Pomegranate.  § Meats and other protein foods: Beans. Almonds. Sunflower seeds. Pine nuts. Peanuts. Cod. [place]. Scallops. Shrimp. Tuna. Tilapia. Clams. Oysters. Eggs.  § Dairy: Low-fat milk. Cheese. Greek yogurt.  § Beverages: Water. Red wine. Herbal tea.  § Fats and oils: Extra virgin olive oil. Avocado oil. Grape seed oil.  § Sweets and desserts: Greek yogurt with honey. Baked apples. Poached pears. Trail mix.  § Seasoning and other foods: Basil. Cilantro. Coriander. Cumin. Mint. Parsley. Abel. Rosemary. Tarragon. Garlic. Oregano. Thyme. Pepper. Balsalmic vinegar. Tahini. Hummus. Tomato sauce. Olives. Mushrooms.  ? Limit these  § Grains: Prepackaged pasta or rice dishes. Prepackaged cereal with added sugar.  § Vegetables: Deep fried potatoes (french fries).  § Fruits: Fruit canned in syrup.  § Meats and other protein foods: Beef. Pork. Lamb. Poultry with skin. Hot dogs. Joseph.  § Dairy: Ice cream. Sour cream. Whole milk.  § Beverages: Juice. Sugar-sweetened soft drinks. Beer. Liquor and spirits.  § Fats and oils: Butter. Canola oil. Vegetable oil. Beef fat (tallow). Lard.  § Sweets and desserts: Cookies. Cakes. Pies. Candy.  § Seasoning and other foods: Mayonnaise. Premade sauces and marinades.  The items listed may not be a complete list. Talk with your dietitian about what dietary choices are right for you.  Summary  · The Mediterranean diet includes both food and lifestyle choices.  · Eat a variety of fresh fruits and vegetables, beans, nuts, seeds, and whole grains.  · Limit the amount of red meat and sweets that you eat.  · Talk with your health care provider about whether it is safe for you to drink red wine in moderation. This means 1 glass a day for nonpregnant women and 2 glasses a day for men. A glass of wine equals 5 oz (150 mL).  This information  is not intended to replace advice given to you by your health care provider. Make sure you discuss any questions you have with your health care provider.  Document Revised: 08/17/2017 Document Reviewed: 08/10/2017  Elsevier Patient Education © 2020 [employer]

## 2021-01-07 NOTE — PROGRESS NOTES
"Chavez Pineda is a 60 y.o. female seen today for Hyperlipidemia (6 month follow up) and Hypothyroidism.     Hyperlipidemia  This is a chronic problem. The current episode started more than 1 year ago. The problem is controlled. Exacerbating diseases include hypothyroidism. She has no history of diabetes. Pertinent negatives include no chest pain or shortness of breath.   Hypothyroidism  This is a chronic problem. The current episode started more than 1 year ago. The problem occurs constantly. The problem has been unchanged. Pertinent negatives include no chest pain, chills, coughing, fatigue or fever.        The following portions of the patient's history were reviewed and updated as appropriate: allergies, current medications, past social history and problem list.    Review of Systems   Constitutional: Negative for chills, fatigue and fever.   Respiratory: Negative for cough, shortness of breath and wheezing.    Cardiovascular: Negative for chest pain, palpitations and leg swelling.   Psychiatric/Behavioral: Positive for sleep disturbance (occ). Negative for dysphoric mood. The patient is not nervous/anxious.        Objective   /70 (BP Location: Right arm, Patient Position: Sitting, Cuff Size: Adult)   Pulse 71   Temp 98.2 °F (36.8 °C)   Ht 170.2 cm (67\")   Wt 76.3 kg (168 lb 3.2 oz)   LMP 08/19/2008   SpO2 98%   BMI 26.34 kg/m²   Physical Exam  Vitals signs and nursing note reviewed.   Constitutional:       General: She is not in acute distress.     Appearance: She is well-developed.   Cardiovascular:      Rate and Rhythm: Normal rate and regular rhythm.      Heart sounds: Normal heart sounds.   Pulmonary:      Effort: No respiratory distress.      Breath sounds: No wheezing or rales.   Chest:      Chest wall: No tenderness.   Psychiatric:         Behavior: Behavior normal.         Assessment/Plan   Problems Addressed this Visit        Unprioritized    Acquired hypothyroidism     Continue " synthroid 50 mcg daily.         Hyperlipidemia, acquired - Primary     Lipid abnormalities are unchanged.  Nutritional counseling was provided.  Lipids will be reassessed in 6 months.           Diagnoses       Codes Comments    Hyperlipidemia, acquired    -  Primary ICD-10-CM: E78.5  ICD-9-CM: 272.4     Acquired hypothyroidism     ICD-10-CM: E03.9  ICD-9-CM: 244.9          Return for CPE in 6-8 mos.

## 2021-01-12 RX ORDER — ATENOLOL 25 MG/1
25 TABLET ORAL DAILY
Qty: 30 TABLET | Refills: 5 | Status: SHIPPED | OUTPATIENT
Start: 2021-01-12 | End: 2021-07-06

## 2021-01-15 ENCOUNTER — IMMUNIZATION (OUTPATIENT)
Dept: VACCINE CLINIC | Facility: HOSPITAL | Age: 61
End: 2021-01-15

## 2021-01-15 PROCEDURE — 91300 HC SARSCOV02 VAC 30MCG/0.3ML IM: CPT | Performed by: INTERNAL MEDICINE

## 2021-01-15 PROCEDURE — 0001A: CPT | Performed by: INTERNAL MEDICINE

## 2021-01-15 PROCEDURE — 0002A: CPT | Performed by: INTERNAL MEDICINE

## 2021-02-05 ENCOUNTER — IMMUNIZATION (OUTPATIENT)
Dept: VACCINE CLINIC | Facility: HOSPITAL | Age: 61
End: 2021-02-05

## 2021-02-05 PROCEDURE — 0002A: CPT | Performed by: INTERNAL MEDICINE

## 2021-02-05 PROCEDURE — 91300 HC SARSCOV02 VAC 30MCG/0.3ML IM: CPT | Performed by: INTERNAL MEDICINE

## 2021-03-30 DIAGNOSIS — E03.9 ACQUIRED HYPOTHYROIDISM: ICD-10-CM

## 2021-03-30 RX ORDER — LEVOTHYROXINE SODIUM 0.05 MG/1
TABLET ORAL
Qty: 90 TABLET | Refills: 1 | Status: SHIPPED | OUTPATIENT
Start: 2021-03-30 | End: 2021-09-27

## 2021-07-06 RX ORDER — ATENOLOL 25 MG/1
TABLET ORAL
Qty: 90 TABLET | Refills: 3 | Status: SHIPPED | OUTPATIENT
Start: 2021-07-06 | End: 2022-06-27

## 2021-08-20 ENCOUNTER — PATIENT MESSAGE (OUTPATIENT)
Dept: INTERNAL MEDICINE | Facility: CLINIC | Age: 61
End: 2021-08-20

## 2021-08-20 DIAGNOSIS — Z12.31 VISIT FOR SCREENING MAMMOGRAM: Primary | ICD-10-CM

## 2021-08-20 NOTE — TELEPHONE ENCOUNTER
From: Deb Pineda  To: Kathy Nunez MD  Sent: 8/20/2021 7:26 AM EDT  Subject: Non-Urgent Medical Question    Dr. Nunez, I am scheduled for a physical on September 13. Should I come in the week before for blood work? Also, do I need to schedule a mammogram? Thanks, Deb Pineda

## 2021-09-13 ENCOUNTER — OFFICE VISIT (OUTPATIENT)
Dept: INTERNAL MEDICINE | Facility: CLINIC | Age: 61
End: 2021-09-13

## 2021-09-13 VITALS
OXYGEN SATURATION: 99 % | HEIGHT: 67 IN | BODY MASS INDEX: 25.58 KG/M2 | TEMPERATURE: 98.4 F | HEART RATE: 54 BPM | DIASTOLIC BLOOD PRESSURE: 70 MMHG | SYSTOLIC BLOOD PRESSURE: 108 MMHG | WEIGHT: 163 LBS

## 2021-09-13 DIAGNOSIS — E78.5 HYPERLIPIDEMIA, ACQUIRED: ICD-10-CM

## 2021-09-13 DIAGNOSIS — Z83.71 FAMILY HISTORY OF COLONIC POLYPS: ICD-10-CM

## 2021-09-13 DIAGNOSIS — E03.9 ACQUIRED HYPOTHYROIDISM: Primary | ICD-10-CM

## 2021-09-13 DIAGNOSIS — R00.2 PALPITATIONS: ICD-10-CM

## 2021-09-13 DIAGNOSIS — Z78.0 MENOPAUSE: ICD-10-CM

## 2021-09-13 DIAGNOSIS — R35.1 NOCTURIA: ICD-10-CM

## 2021-09-13 LAB
ALBUMIN SERPL-MCNC: 4.4 G/DL (ref 3.5–5.2)
ALBUMIN/GLOB SERPL: 2.2 G/DL
ALP SERPL-CCNC: 57 U/L (ref 39–117)
ALT SERPL-CCNC: 14 U/L (ref 1–33)
APPEARANCE UR: CLEAR
AST SERPL-CCNC: 21 U/L (ref 1–32)
BACTERIA #/AREA URNS HPF: NORMAL /HPF
BASOPHILS # BLD AUTO: 0.07 10*3/MM3 (ref 0–0.2)
BASOPHILS NFR BLD AUTO: 1.4 % (ref 0–1.5)
BILIRUB SERPL-MCNC: 0.5 MG/DL (ref 0–1.2)
BILIRUB UR QL STRIP: NEGATIVE
BUN SERPL-MCNC: 12 MG/DL (ref 8–23)
BUN/CREAT SERPL: 13.2 (ref 7–25)
CALCIUM SERPL-MCNC: 9.1 MG/DL (ref 8.6–10.5)
CASTS URNS MICRO: NORMAL
CHLORIDE SERPL-SCNC: 104 MMOL/L (ref 98–107)
CHOLEST SERPL-MCNC: 184 MG/DL (ref 0–200)
CO2 SERPL-SCNC: 29.1 MMOL/L (ref 22–29)
COLOR UR: YELLOW
CREAT SERPL-MCNC: 0.91 MG/DL (ref 0.57–1)
EOSINOPHIL # BLD AUTO: 0.09 10*3/MM3 (ref 0–0.4)
EOSINOPHIL NFR BLD AUTO: 1.8 % (ref 0.3–6.2)
EPI CELLS #/AREA URNS HPF: NORMAL /HPF
ERYTHROCYTE [DISTWIDTH] IN BLOOD BY AUTOMATED COUNT: 12.8 % (ref 12.3–15.4)
GLOBULIN SER CALC-MCNC: 2 GM/DL
GLUCOSE SERPL-MCNC: 95 MG/DL (ref 65–99)
GLUCOSE UR QL: NEGATIVE
HCT VFR BLD AUTO: 39.7 % (ref 34–46.6)
HDLC SERPL-MCNC: 57 MG/DL (ref 40–60)
HGB BLD-MCNC: 12.4 G/DL (ref 12–15.9)
HGB UR QL STRIP: NEGATIVE
IMM GRANULOCYTES # BLD AUTO: 0 10*3/MM3 (ref 0–0.05)
IMM GRANULOCYTES NFR BLD AUTO: 0 % (ref 0–0.5)
KETONES UR QL STRIP: ABNORMAL
LDLC SERPL CALC-MCNC: 117 MG/DL (ref 0–100)
LEUKOCYTE ESTERASE UR QL STRIP: ABNORMAL
LYMPHOCYTES # BLD AUTO: 1.7 10*3/MM3 (ref 0.7–3.1)
LYMPHOCYTES NFR BLD AUTO: 34.5 % (ref 19.6–45.3)
MCH RBC QN AUTO: 28.3 PG (ref 26.6–33)
MCHC RBC AUTO-ENTMCNC: 31.2 G/DL (ref 31.5–35.7)
MCV RBC AUTO: 90.6 FL (ref 79–97)
MONOCYTES # BLD AUTO: 0.44 10*3/MM3 (ref 0.1–0.9)
MONOCYTES NFR BLD AUTO: 8.9 % (ref 5–12)
NEUTROPHILS # BLD AUTO: 2.63 10*3/MM3 (ref 1.7–7)
NEUTROPHILS NFR BLD AUTO: 53.4 % (ref 42.7–76)
NITRITE UR QL STRIP: NEGATIVE
NRBC BLD AUTO-RTO: 0 /100 WBC (ref 0–0.2)
PH UR STRIP: 6 [PH] (ref 5–8)
PLATELET # BLD AUTO: 211 10*3/MM3 (ref 140–450)
POTASSIUM SERPL-SCNC: 4.7 MMOL/L (ref 3.5–5.2)
PROT SERPL-MCNC: 6.4 G/DL (ref 6–8.5)
PROT UR QL STRIP: NEGATIVE
RBC # BLD AUTO: 4.38 10*6/MM3 (ref 3.77–5.28)
RBC #/AREA URNS HPF: NORMAL /HPF
SODIUM SERPL-SCNC: 141 MMOL/L (ref 136–145)
SP GR UR: 1.02 (ref 1–1.03)
TRIGL SERPL-MCNC: 53 MG/DL (ref 0–150)
TSH SERPL DL<=0.005 MIU/L-ACNC: 2.21 UIU/ML (ref 0.27–4.2)
UROBILINOGEN UR STRIP-MCNC: ABNORMAL MG/DL
VLDLC SERPL CALC-MCNC: 10 MG/DL (ref 5–40)
WBC # BLD AUTO: 4.93 10*3/MM3 (ref 3.4–10.8)
WBC #/AREA URNS HPF: NORMAL /HPF

## 2021-09-13 PROCEDURE — 99213 OFFICE O/P EST LOW 20 MIN: CPT | Performed by: INTERNAL MEDICINE

## 2021-09-13 NOTE — ASSESSMENT & PLAN NOTE
Lipid abnormalities are unchanged.  Continue diet/exercise.  Lipids will be reassessed in 6 months.

## 2021-09-13 NOTE — PROGRESS NOTES
"Chief Complaint  Hyperlipidemia (7 month follow up) and Hypothyroidism    Subjective          Deb Wetterer presents to [facility] for   Hyperlipidemia  This is a chronic problem. The current episode started more than 1 year ago. The problem is controlled. Recent lipid tests were reviewed and are normal. Exacerbating diseases include hypothyroidism. She has no history of diabetes.   Hypothyroidism  This is a chronic problem. The current episode started more than 1 year ago. The problem occurs constantly. The problem has been unchanged.       Review of Systems     Objective   Vital Signs:   /70 (BP Location: Left arm, Patient Position: Sitting, Cuff Size: Adult)   Pulse 54   Temp 98.4 °F (36.9 °C)   Ht 170.2 cm (67\")   Wt 73.9 kg (163 lb)   SpO2 99%   BMI 25.53 kg/m²     Physical Exam  Vitals and nursing note reviewed.   Constitutional:       General: She is not in acute distress.     Appearance: She is well-developed.   Cardiovascular:      Rate and Rhythm: Normal rate and regular rhythm.      Heart sounds: Normal heart sounds.   Pulmonary:      Effort: No respiratory distress.      Breath sounds: No wheezing or rales.   Chest:      Chest wall: No tenderness.   Psychiatric:         Behavior: Behavior normal.        Result Review :                 Assessment and Plan    Problem List Items Addressed This Visit        Unprioritized    Family history of colonic polyps    Current Assessment & Plan     Need colonoscopy q 5 yrs (due mid December 2021)         Relevant Orders    Ambulatory Referral to General Surgery    Acquired hypothyroidism - Primary    Current Assessment & Plan     Continue synthroid 50 mcg daily         Relevant Orders    TSH Rfx On Abnormal To Free T4    Hyperlipidemia, acquired    Current Assessment & Plan     Lipid abnormalities are unchanged.  Continue diet/exercise.  Lipids will be reassessed in 6 months.         Relevant Orders    CBC & Differential    " Comprehensive Metabolic Panel    Lipid Panel    Palpitations    Overview     History of SVT - ok with atenolol.         Nocturia    Current Assessment & Plan     Call if worse         Relevant Orders    Urinalysis With Microscopic If Indicated (No Culture) - Urine, Clean Catch      Other Visit Diagnoses     Menopause        Relevant Orders    DEXA Bone Density Axial          Follow Up   Return in about 6 months (around 3/13/2022) for Annual physical.  Patient was given instructions and counseling regarding her condition or for health maintenance advice. Please see specific information pulled into the AVS if appropriate.

## 2021-09-15 ENCOUNTER — CLINICAL SUPPORT (OUTPATIENT)
Dept: INTERNAL MEDICINE | Facility: CLINIC | Age: 61
End: 2021-09-15

## 2021-09-15 DIAGNOSIS — Z78.0 MENOPAUSE: ICD-10-CM

## 2021-09-15 PROCEDURE — 77080 DXA BONE DENSITY AXIAL: CPT | Performed by: INTERNAL MEDICINE

## 2021-09-24 ENCOUNTER — PATIENT MESSAGE (OUTPATIENT)
Dept: INTERNAL MEDICINE | Facility: CLINIC | Age: 61
End: 2021-09-24

## 2021-09-24 NOTE — TELEPHONE ENCOUNTER
From: Deb Pineda  To: Kathy Nunez MD  Sent: 9/24/2021 8:18 AM EDT  Subject: Prescription Question    We are going on a trip to [facility] sn the group that we are going with said we should bring a copy of the prescription meds we have with us. Can you email me a copy of the prescription for levothyroxine? Thanks

## 2021-09-25 DIAGNOSIS — E03.9 ACQUIRED HYPOTHYROIDISM: ICD-10-CM

## 2021-09-27 RX ORDER — LEVOTHYROXINE SODIUM 0.05 MG/1
TABLET ORAL
Qty: 90 TABLET | Refills: 1 | Status: SHIPPED | OUTPATIENT
Start: 2021-09-27 | End: 2022-02-23 | Stop reason: SDUPTHER

## 2021-12-09 ENCOUNTER — HOSPITAL ENCOUNTER (OUTPATIENT)
Dept: MAMMOGRAPHY | Facility: HOSPITAL | Age: 61
Discharge: HOME | End: 2021-12-09
Admitting: INTERNAL MEDICINE

## 2021-12-09 DIAGNOSIS — Z12.31 VISIT FOR SCREENING MAMMOGRAM: ICD-10-CM

## 2021-12-09 PROCEDURE — 77063 BREAST TOMOSYNTHESIS BI: CPT

## 2021-12-09 PROCEDURE — 77067 SCR MAMMO BI INCL CAD: CPT

## 2022-01-26 ENCOUNTER — OFFICE VISIT (OUTPATIENT)
Dept: CARDIOLOGY | Facility: CLINIC | Age: 62
End: 2022-01-26

## 2022-01-26 VITALS
SYSTOLIC BLOOD PRESSURE: 102 MMHG | HEIGHT: 67 IN | DIASTOLIC BLOOD PRESSURE: 68 MMHG | HEART RATE: 56 BPM | WEIGHT: 174.2 LBS | BODY MASS INDEX: 27.34 KG/M2

## 2022-01-26 DIAGNOSIS — I47.1 PSVT (PAROXYSMAL SUPRAVENTRICULAR TACHYCARDIA): Primary | ICD-10-CM

## 2022-01-26 PROCEDURE — 99213 OFFICE O/P EST LOW 20 MIN: CPT | Performed by: INTERNAL MEDICINE

## 2022-01-26 NOTE — PROGRESS NOTES
[facility]      Patient Name: Deb Pineda  :1960  Age: 61 y.o.  Encounter Provider:  Genaro Jain Jr, MD      Chief Complaint: Follow-up PSVT      HPI  Deb Pineda is a 61 y.o. female past medical history of hypothyroidism who presents for follow-up evaluation of syncope.      Last clinic visit note: Patient was in [place] in the end of January and felt slightly dizzy when sitting up from a lying position.  She stood up to go to the bathroom and fell hitting her head on the counter.  She states that her legs just gave out and does not think that she lost consciousness.  She walked in to see her  in the kitchen and when he was looking at the injury on her head her eyes rolled backwards and she passed out.  It was only for a few seconds and there was no convulsive activity.  There was no loss of bowel or bladder continence.  When she awoke she did not feel postictal.  She had some dizziness still but no chest pain, shortness of air or palpitations.  Although she did not notice palpitations with this episode she does have occasional palpitations which lasts for seconds to minutes.  She is very active and does not have any limiting symptoms.  They did a Holter monitor which showed frequent supraventricular ectopy.  The patient did not keep a diary during this monitoring period as she was not told to do so.  Echocardiogram was performed showing normal left ventricular ejection fraction and no significant valvular heart disease.  She has had no recurrent syncope but does note occasional positional dizziness.  She is a lifelong non-smoker who drinks occasionally and denies illicit drug use.  She denies any fevers chills cough or otherwise infectious prodrome.  Both parents were noted to have atrial fibrillation.  She also has a brother who  of ruptured aortic aneurysm but she is unsure of whether or not this was thoracic versus abdominal.  She had her TSH checked in  of  last year which was within normal limits but no recent check since then.      Extended ambulatory telemetry was performed showing frequent episodes of SVT that were very fast with heart rates going up into the 200s.  Restarted on low-dose atenolol and she is done well with this.  Significant decrease in the frequency of palpitations although she continues to have them nightly.  Resting heart rate today 58 bpm.  No dizziness or syncope.  She remains active without any angina or heart failure.  She was able to discuss past family history with other members and it is been confirmed that her brother  of a thoracic aortic aneurysm rupture.    Given her family history of thoracic aortic aneurysm rupture a CT without contrast was performed showing normal caliber thoracic aorta.  Vascular calcifications were noted within the coronary distribution and given recurrent atypical chest pain a stress study was performed showing no evidence of myocardial ischemia.  Since starting low-dose metoprolol she is done well.  She remains active without limiting symptoms.  She has occasional palpitations that last anywhere from 15 to 30 seconds usually at night.  She does not have symptoms when she is exercising.  She is tolerating medications well.      Doing well since last visit.  Tolerating atenolol well.  She has occasional palpitations but no dizziness or syncope.  No orthopnea, PND or edema.  Very active with no chest pain or shortness of air.  Social and family history reviewed and not pertinent to this clinic visit.    The following portions of the patient's history were reviewed and updated as appropriate: allergies, current medications, past family history, past medical history, past social history, past surgical history and problem list.      Review of Systems   Constitutional: Negative for chills and fever.   HENT: Negative for hoarse voice and sore throat.    Eyes: Negative for double vision and photophobia.  "  Cardiovascular: Negative for chest pain, leg swelling, near-syncope, orthopnea, palpitations, paroxysmal nocturnal dyspnea and syncope.   Respiratory: Negative for cough and wheezing.    Skin: Negative for itching, poor wound healing and rash.   Musculoskeletal: Negative for arthritis, joint pain and joint swelling.   Gastrointestinal: Negative for bloating, abdominal pain, hematemesis and hematochezia.   Neurological: Negative for dizziness and focal weakness.   Psychiatric/Behavioral: Negative for altered mental status, depression and suicidal ideas.   All other systems reviewed and are negative.    ROS was reviewed, updated and amended when necessary.    OBJECTIVE:   Vital Signs  Vitals:    01/26/22 1031   BP: 102/68   Pulse: 56     Estimated body mass index is 27.28 kg/m² as calculated from the following:    Height as of this encounter: 170.2 cm (67\").    Weight as of this encounter: 79 kg (174 lb 3.2 oz).    Physical Exam  Vitals reviewed.   Constitutional:       Appearance: She is well-developed.   HENT:      Head: Normocephalic and atraumatic.   Eyes:      Conjunctiva/sclera: Conjunctivae normal.      Pupils: Pupils are equal, round, and reactive to light.   Neck:      Thyroid: No thyromegaly.      Vascular: No JVD.   Cardiovascular:      Heart sounds: No murmur heard.  No friction rub. No gallop.    Pulmonary:      Effort: No respiratory distress.   Chest:      Chest wall: No tenderness.   Abdominal:      General: Bowel sounds are normal. There is no distension.   Musculoskeletal:         General: No tenderness.   Skin:     Findings: No erythema or rash.   Neurological:      Mental Status: She is alert and oriented to person, place, and time.   Psychiatric:         Judgment: Judgment normal.       Physical exam was reviewed, updated and amended when necessary.    Procedures          ASSESSMENT:     Atypical chest pain  PSVT  Hypothyroidism    PLAN OF CARE:     1. PSVT -no sustained episodes on atenolol.  " Tolerating medication well.  Continue same.  2. Chest discomfort -no evidence of thoracic aortic aneurysm and no evidence of myocardial ischemia.  Mild vascular calcifications were noted in the coronary distribution but the patient has well-controlled lipids and her ASCVD is 3.5%.  No indication for starting aspirin at this time.  3. Hypothyroidism -TSH within normal range.    Return to clinic 12 months             Discharge Medications          Accurate as of January 26, 2022 10:31 AM. If you have any questions, ask your nurse or doctor.            Continue These Medications      Instructions Start Date   atenolol 25 MG tablet  Commonly known as: TENORMIN   TAKE ONE TABLET BY MOUTH DAILY      levothyroxine 50 MCG tablet  Commonly known as: SYNTHROID, LEVOTHROID   TAKE ONE TABLET BY MOUTH DAILY      multivitamin tablet tablet  Commonly known as: THERAGRAN   1 tablet, Oral, Daily             Thank you for allowing me to participate in the care of your patient,      Sincerely,   Genaro Jain MD  [facility]  01/26/22  10:31 EST

## 2022-02-23 DIAGNOSIS — E03.9 ACQUIRED HYPOTHYROIDISM: ICD-10-CM

## 2022-02-23 RX ORDER — LEVOTHYROXINE SODIUM 0.05 MG/1
50 TABLET ORAL DAILY
Qty: 90 TABLET | Refills: 0 | Status: SHIPPED | OUTPATIENT
Start: 2022-02-23

## 2022-02-23 NOTE — TELEPHONE ENCOUNTER
Caller: Deb Pineda    Relationship: Self    Best call back number: [phone] (M)    Requested Prescriptions:   Requested Prescriptions     Pending Prescriptions Disp Refills   • levothyroxine (SYNTHROID, LEVOTHROID) 50 MCG tablet 90 tablet 1     Sig: Take 1 tablet by mouth Daily.        Pharmacy where request should be sent: [facility] [place] [address] AT [address]  - [phone]  - [phone] FX     Additional details provided by patient: PATIENT HAS AN APPT SET UP WITH A NEW PROVIDER BUT IT WILL NOT BE FOR A COUPLE OF MONTHS, PLEASE ADVISE IF THIS CAN BE REFILLED ASAP    Does the patient have less than a 3 day supply:  [x] Yes  [] No    Rocio Urbina Rep   02/23/22 12:39 EST

## 2022-06-27 DIAGNOSIS — E03.9 ACQUIRED HYPOTHYROIDISM: ICD-10-CM

## 2022-06-27 RX ORDER — ATENOLOL 25 MG/1
TABLET ORAL
Qty: 90 TABLET | Refills: 3 | Status: SHIPPED | OUTPATIENT
Start: 2022-06-27

## 2022-06-28 RX ORDER — LEVOTHYROXINE SODIUM 0.05 MG/1
TABLET ORAL
Qty: 90 TABLET | Refills: 0 | OUTPATIENT
Start: 2022-06-28

## 2022-10-05 ENCOUNTER — TELEPHONE (OUTPATIENT)
Dept: CARDIOLOGY | Facility: CLINIC | Age: 62
End: 2022-10-05

## 2022-10-05 NOTE — TELEPHONE ENCOUNTER
Pt called stating she had left thumb surgery yesterday. After surgery, she was being wheeled to her car when she lost consciousness.  Please see ER note from Danica(10/4/22, epic).  Pt states she was told to hold her Atenolol and to avoid narcotics.  Pt was told to call our office and follow up.  Thanks/michelle

## 2022-10-06 NOTE — TELEPHONE ENCOUNTER
I called and s/w pt.  She stated she already has an appointment for tomorrow(10/7)with Lily.  Thanks/jlbrian

## 2022-10-07 ENCOUNTER — OFFICE VISIT (OUTPATIENT)
Dept: CARDIOLOGY | Facility: CLINIC | Age: 62
End: 2022-10-07

## 2022-10-07 VITALS
DIASTOLIC BLOOD PRESSURE: 90 MMHG | HEART RATE: 67 BPM | WEIGHT: 180.2 LBS | BODY MASS INDEX: 28.28 KG/M2 | HEIGHT: 67 IN | SYSTOLIC BLOOD PRESSURE: 120 MMHG

## 2022-10-07 DIAGNOSIS — I47.1 PSVT (PAROXYSMAL SUPRAVENTRICULAR TACHYCARDIA): Primary | ICD-10-CM

## 2022-10-07 PROCEDURE — 93000 ELECTROCARDIOGRAM COMPLETE: CPT | Performed by: NURSE PRACTITIONER

## 2022-10-07 PROCEDURE — 99214 OFFICE O/P EST MOD 30 MIN: CPT | Performed by: NURSE PRACTITIONER

## 2022-10-07 NOTE — PROGRESS NOTES
Date of Office Visit: 10/07/22  Encounter Provider: MEGAN Ewing  Place of Service: [facility]  Patient Name: Deb Pineda  :1960    Chief Complaint   Patient presents with   • Hyperlipidemia   • Palpitations   :     HPI: Deb Pineda is a 62 y.o. female  with paroxysmal supraventricular tachycardia, coronary artery calcification by CT, syncope, and hypothyroidism  She has family history with both parents were noted to have atrial fibrillation.  She also has a brother who  of ruptured aortic aneurysm but she is unsure of whether or not this was thoracic versus abdominal.      She is followed by Dr. Jain.  I will visit with her for the first time today and reviewed her medical record.    In 2020, she had dizzy spells and a fall where she did not lose consciousness initially.  She walked in the kitchen to see her .  She looked at the injury on her head and her eyes rolled backwards and she passed out for few seconds with no convulsive activity and no  loss of bowel or bladder.  She had an unremarkable echocardiogram.  She wore an extended ambulatory telemetry 2020 which showed frequent nonsustained episodes of SVT with heart rate going up to 200s and she was started on low-dose atenolol.  She had a CT without contrast performed which showed normal caliber thoracic aorta.  There are vascular calcifications noted within the coronary arteries she had a stress study which showed no evidence of ischemia 2020      She presents today for reassessment.  She had trigger finger released on Tuesday.  After the procedure she was told her heart rate was in the 30s and she reports a syncopal episode.  They took her to be evaluated in the emergency room and told her not to take atenolol.  She has been checking her vitals at home the last couple days and her blood pressure has been stable and similar to what it is reading today.  Her pulse has  "been 60s to 70s.  She denies chest pain or shortness of breath.  Prior to this isolated episode associated with low risk procedure she has not had any other syncope.  She reports 1 episode while doing yoga where she stood up and got a little dizzy but she did not pass out.  That was also an isolated event.  She will be meeting a new [facility] primary care physician soon.    Allergies   Allergen Reactions   • Codeine Unknown (See Comments)     Unknown reaction           Family and social history reviewed.     ROS  All other systems were reviewed and are negative          Objective:     Vitals:    10/07/22 0808   BP: 120/90   BP Location: Right arm   Patient Position: Sitting   Cuff Size: Adult   Pulse: 67   Weight: 81.7 kg (180 lb 3.2 oz)   Height: 170.2 cm (67\")     Body mass index is 28.22 kg/m².    PHYSICAL EXAM:  Pulmonary:      Effort: Pulmonary effort is normal.      Breath sounds: Normal breath sounds.   Cardiovascular:      Normal rate. Regular rhythm.           ECG 12 Lead    Date/Time: 10/7/2022 8:25 AM  Performed by: Lily Mooney APRN  Authorized by: Lily Mooney APRN   Comparison: compared with previous ECG   Similar to previous ECG  Rhythm: sinus rhythm  Rate: normal  QRS axis: normal                Current Outpatient Medications   Medication Sig Dispense Refill   • levothyroxine (SYNTHROID, LEVOTHROID) 50 MCG tablet Take 1 tablet by mouth Daily. 90 tablet 0   • Multiple Vitamin (MULTI VITAMIN DAILY PO) Take 1 tablet by mouth daily.     • atenolol (TENORMIN) 25 MG tablet TAKE ONE TABLET BY MOUTH DAILY 90 tablet 3     No current facility-administered medications for this visit.     Assessment:       Diagnosis Plan   1. PSVT (paroxysmal supraventricular tachycardia) (HCC)  ECG 12 Lead        Orders Placed This Encounter   Procedures   • ECG 12 Lead     This order was created via procedure documentation     Order Specific Question:   Release to patient     Answer:   Routine Release         Plan:     "     1.  62-year-old female with paroxysmal supraventricular tachycardia.  She has been stable on atenolol since 2020.  She had an isolated episode of syncope this week after having conscious sedation.  Her pulse was reportedly in the 30s.  She will resume atenolol 12.5 mg daily x1 week and keep track of her blood pressure and pulse readings.  Providing her pulse remains 50s and above she will increase back to full tablet daily which she has been stable on for 2 years.  In the future, if she has conscious sedation we may need to hold atenolol 2 to 3 days prior to prevent this from happening again patient verbalized understanding  2.  Coronary calcification noted on prior CT.  She had a nonischemic perfusion stress test in 2020.  She has no ischemic EKG changes today.  No chest pain or dyspnea on exertion.  3.  Hypothyroidism on replacement therapy  4.  No evidence of thoracic aortic aneurysm on CT of the chest without contrast July 2020  5.  Hyperlipidemia.  Her LDL is 117.  Target LDL 70 or less due to evidence of coronary calcification.  Would recommend that she is on statin therapy and defer to PCP      Follow-up in January as scheduled.  Contact our office with questions or concerns.            It has been a pleasure to participate in this patient's care.      Thank you,  MEGAN Ewing      **I used Dragon to dictate this note:**

## 2022-10-13 ENCOUNTER — TELEPHONE (OUTPATIENT)
Dept: CARDIOLOGY | Facility: CLINIC | Age: 62
End: 2022-10-13

## 2022-10-13 NOTE — TELEPHONE ENCOUNTER
We received a fax regarding pt's most recent echo.  I have placed the fax in your inbox for your review. Thanks/michelle

## 2022-11-30 ENCOUNTER — TRANSCRIBE ORDERS (OUTPATIENT)
Dept: ADMINISTRATIVE | Facility: HOSPITAL | Age: 62
End: 2022-11-30

## 2022-11-30 DIAGNOSIS — Z12.31 VISIT FOR SCREENING MAMMOGRAM: Primary | ICD-10-CM

## 2022-12-28 ENCOUNTER — HOSPITAL ENCOUNTER (OUTPATIENT)
Dept: MAMMOGRAPHY | Facility: HOSPITAL | Age: 62
Discharge: HOME | End: 2022-12-28
Admitting: INTERNAL MEDICINE

## 2022-12-28 DIAGNOSIS — Z12.31 VISIT FOR SCREENING MAMMOGRAM: ICD-10-CM

## 2022-12-28 PROCEDURE — 77067 SCR MAMMO BI INCL CAD: CPT

## 2022-12-28 PROCEDURE — 77063 BREAST TOMOSYNTHESIS BI: CPT

## 2023-03-24 ENCOUNTER — OFFICE VISIT (OUTPATIENT)
Dept: CARDIOLOGY | Facility: CLINIC | Age: 63
End: 2023-03-24
Payer: COMMERCIAL

## 2023-03-24 VITALS
HEART RATE: 72 BPM | SYSTOLIC BLOOD PRESSURE: 120 MMHG | DIASTOLIC BLOOD PRESSURE: 78 MMHG | WEIGHT: 174 LBS | BODY MASS INDEX: 27.31 KG/M2 | HEIGHT: 67 IN

## 2023-03-24 DIAGNOSIS — I47.1 PSVT (PAROXYSMAL SUPRAVENTRICULAR TACHYCARDIA): Primary | ICD-10-CM

## 2023-03-24 PROCEDURE — 99213 OFFICE O/P EST LOW 20 MIN: CPT | Performed by: INTERNAL MEDICINE

## 2023-03-24 NOTE — PROGRESS NOTES
[facility]      Patient Name: Deb Pineda  :1960  Age: 62 y.o.  Encounter Provider:  Genaro Jain Jr, MD      Chief Complaint: Follow-up PSVT      HPI  Deb Pineda is a 62 y.o. female past medical history of hypothyroidism who presents for follow-up evaluation of syncope.      Last clinic visit note: Patient was in [place] in the end of January and felt slightly dizzy when sitting up from a lying position.  She stood up to go to the bathroom and fell hitting her head on the counter.  She states that her legs just gave out and does not think that she lost consciousness.  She walked in to see her  in the kitchen and when he was looking at the injury on her head her eyes rolled backwards and she passed out.  It was only for a few seconds and there was no convulsive activity.  There was no loss of bowel or bladder continence.  When she awoke she did not feel postictal.  She had some dizziness still but no chest pain, shortness of air or palpitations.  Although she did not notice palpitations with this episode she does have occasional palpitations which lasts for seconds to minutes.  She is very active and does not have any limiting symptoms.  They did a Holter monitor which showed frequent supraventricular ectopy.  The patient did not keep a diary during this monitoring period as she was not told to do so.  Echocardiogram was performed showing normal left ventricular ejection fraction and no significant valvular heart disease.  She has had no recurrent syncope but does note occasional positional dizziness.  She is a lifelong non-smoker who drinks occasionally and denies illicit drug use.  She denies any fevers chills cough or otherwise infectious prodrome.  Both parents were noted to have atrial fibrillation.  She also has a brother who  of ruptured aortic aneurysm but she is unsure of whether or not this was thoracic versus abdominal.  She had her TSH checked in  of  last year which was within normal limits but no recent check since then.      Extended ambulatory telemetry was performed showing frequent episodes of SVT that were very fast with heart rates going up into the 200s.  Restarted on low-dose atenolol and she is done well with this.  Significant decrease in the frequency of palpitations although she continues to have them nightly.  Resting heart rate today 58 bpm.  No dizziness or syncope.  She remains active without any angina or heart failure.  She was able to discuss past family history with other members and it is been confirmed that her brother  of a thoracic aortic aneurysm rupture.    Given her family history of thoracic aortic aneurysm rupture a CT without contrast was performed showing normal caliber thoracic aorta.  Vascular calcifications were noted within the coronary distribution and given recurrent atypical chest pain a stress study was performed showing no evidence of myocardial ischemia.  Since starting low-dose metoprolol she is done well.  She remains active without limiting symptoms.  She has occasional palpitations that last anywhere from 15 to 30 seconds usually at night.  She does not have symptoms when she is exercising.  She is tolerating medications well.      She continues to do well and tolerate atenolol without issue.  Going to the gym 4-5 times per week with no limiting symptoms.  Blood pressure and heart rate are well controlled today.  Occasional brief palpitations with no dizziness or syncope.  No chest pain or shortness of air with activity.  No orthopnea, PND or edema.  Social and family history was reviewed and is not pertinent to this clinic visit.    The following portions of the patient's history were reviewed and updated as appropriate: allergies, current medications, past family history, past medical history, past social history, past surgical history and problem list.      Review of Systems   Constitutional: Negative for  "chills and fever.   HENT: Negative for hoarse voice and sore throat.    Eyes: Negative for double vision and photophobia.   Cardiovascular: Negative for chest pain, leg swelling, near-syncope, orthopnea, palpitations, paroxysmal nocturnal dyspnea and syncope.   Respiratory: Negative for cough and wheezing.    Skin: Negative for itching, poor wound healing and rash.   Musculoskeletal: Negative for arthritis, joint pain and joint swelling.   Gastrointestinal: Negative for bloating, abdominal pain, hematemesis and hematochezia.   Neurological: Negative for dizziness and focal weakness.   Psychiatric/Behavioral: Negative for altered mental status, depression and suicidal ideas.   All other systems reviewed and are negative.    ROS was reviewed, updated and amended when necessary.    OBJECTIVE:   Vital Signs  Vitals:    03/24/23 1025   BP: 120/78   Pulse: 72     Estimated body mass index is 27.25 kg/m² as calculated from the following:    Height as of this encounter: 170.2 cm (67\").    Weight as of this encounter: 78.9 kg (174 lb).    Physical Exam  Vitals reviewed.   Constitutional:       Appearance: She is well-developed.   HENT:      Head: Normocephalic and atraumatic.   Eyes:      Conjunctiva/sclera: Conjunctivae normal.      Pupils: Pupils are equal, round, and reactive to light.   Neck:      Thyroid: No thyromegaly.      Vascular: No JVD.   Cardiovascular:      Heart sounds: No murmur heard.    No friction rub. No gallop.   Pulmonary:      Effort: No respiratory distress.   Chest:      Chest wall: No tenderness.   Abdominal:      General: Bowel sounds are normal. There is no distension.   Musculoskeletal:         General: No tenderness.   Skin:     Findings: No erythema or rash.   Neurological:      Mental Status: She is alert and oriented to person, place, and time.   Psychiatric:         Judgment: Judgment normal.       Physical exam was reviewed, updated and amended when necessary.    Procedures        "   ASSESSMENT:     Atypical chest pain  PSVT  Hypothyroidism    PLAN OF CARE:     1. PSVT -no sustained episodes on atenolol.  Tolerating medication well.  Continue same.  2. Chest discomfort -no evidence of thoracic aortic aneurysm and no evidence of myocardial ischemia.  Mild vascular calcifications were noted in the coronary distribution but the patient has well-controlled lipids and her ASCVD is 3.5%.  No indication for starting aspirin at this time.  3. Hypothyroidism -TSH within normal range.    Return to clinic 12 months             Discharge Medications          Accurate as of March 24, 2023 10:38 AM. If you have any questions, ask your nurse or doctor.            Continue These Medications      Instructions Start Date   atenolol 25 MG tablet  Commonly known as: TENORMIN   TAKE ONE TABLET BY MOUTH DAILY      levothyroxine 50 MCG tablet  Commonly known as: SYNTHROID, LEVOTHROID   50 mcg, Oral, Daily      multivitamin tablet tablet  Commonly known as: THERAGRAN   1 tablet, Oral, Daily             Thank you for allowing me to participate in the care of your patient,      Sincerely,   Genaro Jain MD  [facility]  03/24/23  10:38 EDT

## 2023-08-16 ENCOUNTER — TELEPHONE (OUTPATIENT)
Dept: CARDIOLOGY | Facility: CLINIC | Age: 63
End: 2023-08-16
Payer: COMMERCIAL

## 2023-08-16 NOTE — TELEPHONE ENCOUNTER
I spoke with the patient.  She shares that the consumption of alcohol occurred on Saturday.  When she had LOC, it was Sunday morning around 01:00.  She did hit her head because she has a bruise to her cheek and it is tender. She did not go to the ED to be evaluated; denies a HA.  She believes she had LOC for 1-2 minutes but she isn't sure.  Denies any lightheadedness or dizziness since her fall.    She hadn't checked her BP since the fall.  She checked it while on the phone with me: 142/83, HR 59.  Medication taken at 07:00 today.    She is still on atenolol 25 mg QD.    Do you have any recommendations for this patient?    Thank you,    Louise Cook RN  [facility] [place] [facility]  08/16/23  11:17 EDT

## 2023-08-16 NOTE — TELEPHONE ENCOUNTER
----- Message from Seble Wilkinson MA sent at 8/16/2023 11:00 AM EDT -----  Regarding: FW: Passing out  Contact: [phone]  Can you please call to triage?  Thanks/[email or web address]  ----- Message -----  From: Deb Pineda  Sent: 8/16/2023  10:52 AM EDT  To: Kyler magdi [facility]  Subject: Passing out                                      I had an episode early Sunday morning where I felt light headed and passed out for a very short time. I just wanted to touch base to see if I need to do anything, mainly for my  and kids!    I had a couple glasses of wine at a shower in the afternoon- not much food and then I had 3 drinks at a soccer game that night. Again not much food.     I have low blood pressure and am assuming it was that added to the above. Please let me know if I should do anything differently.     I have felt light headed a couple times since my checkup in January I believe. But no passing out.     Thanks,  Deb Pineda

## 2023-08-16 NOTE — TELEPHONE ENCOUNTER
I have discussed recommendations with patient who told me she did not think she needed a sooner appt. See mychart message.

## 2023-12-19 ENCOUNTER — TRANSCRIBE ORDERS (OUTPATIENT)
Dept: ADMINISTRATIVE | Facility: HOSPITAL | Age: 63
End: 2023-12-19
Payer: COMMERCIAL

## 2023-12-19 DIAGNOSIS — Z12.31 SCREENING MAMMOGRAM, ENCOUNTER FOR: Primary | ICD-10-CM

## 2024-01-17 ENCOUNTER — HOSPITAL ENCOUNTER (OUTPATIENT)
Dept: MAMMOGRAPHY | Facility: HOSPITAL | Age: 64
Discharge: HOME | End: 2024-01-17
Admitting: INTERNAL MEDICINE
Payer: COMMERCIAL

## 2024-01-17 DIAGNOSIS — Z12.31 SCREENING MAMMOGRAM, ENCOUNTER FOR: ICD-10-CM

## 2024-01-17 PROCEDURE — 77063 BREAST TOMOSYNTHESIS BI: CPT

## 2024-01-17 PROCEDURE — 77067 SCR MAMMO BI INCL CAD: CPT

## 2024-04-03 ENCOUNTER — OFFICE VISIT (OUTPATIENT)
Dept: CARDIOLOGY | Facility: CLINIC | Age: 64
End: 2024-04-03
Payer: COMMERCIAL

## 2024-04-03 VITALS
BODY MASS INDEX: 26.09 KG/M2 | HEART RATE: 55 BPM | SYSTOLIC BLOOD PRESSURE: 120 MMHG | HEIGHT: 67 IN | WEIGHT: 166.2 LBS | DIASTOLIC BLOOD PRESSURE: 80 MMHG

## 2024-04-03 DIAGNOSIS — I47.10 PSVT (PAROXYSMAL SUPRAVENTRICULAR TACHYCARDIA): Primary | ICD-10-CM

## 2024-04-03 PROCEDURE — 93000 ELECTROCARDIOGRAM COMPLETE: CPT | Performed by: INTERNAL MEDICINE

## 2024-04-03 PROCEDURE — 99213 OFFICE O/P EST LOW 20 MIN: CPT | Performed by: INTERNAL MEDICINE

## 2024-04-03 NOTE — PROGRESS NOTES
[facility]      Patient Name: Deb Pineda  :1960  Age: 63 y.o.  Encounter Provider:  Genaro Jain Jr, MD      Chief Complaint: Follow-up PSVT      HPI  Deb Pineda is a 63 y.o. female past medical history of hypothyroidism who presents for follow-up evaluation of syncope.      Last clinic visit note: Patient was in [place] in the end of January and felt slightly dizzy when sitting up from a lying position.  She stood up to go to the bathroom and fell hitting her head on the counter.  She states that her legs just gave out and does not think that she lost consciousness.  She walked in to see her  in the kitchen and when he was looking at the injury on her head her eyes rolled backwards and she passed out.  It was only for a few seconds and there was no convulsive activity.  There was no loss of bowel or bladder continence.  When she awoke she did not feel postictal.  She had some dizziness still but no chest pain, shortness of air or palpitations.  Although she did not notice palpitations with this episode she does have occasional palpitations which lasts for seconds to minutes.  She is very active and does not have any limiting symptoms.  They did a Holter monitor which showed frequent supraventricular ectopy.  The patient did not keep a diary during this monitoring period as she was not told to do so.  Echocardiogram was performed showing normal left ventricular ejection fraction and no significant valvular heart disease.  She has had no recurrent syncope but does note occasional positional dizziness.  She is a lifelong non-smoker who drinks occasionally and denies illicit drug use.  She denies any fevers chills cough or otherwise infectious prodrome.  Both parents were noted to have atrial fibrillation.  She also has a brother who  of ruptured aortic aneurysm but she is unsure of whether or not this was thoracic versus abdominal.  She had her TSH checked in  of  last year which was within normal limits but no recent check since then.      Extended ambulatory telemetry was performed showing frequent episodes of SVT that were very fast with heart rates going up into the 200s.  Restarted on low-dose atenolol and she is done well with this.  Significant decrease in the frequency of palpitations although she continues to have them nightly.  Resting heart rate today 58 bpm.  No dizziness or syncope.  She remains active without any angina or heart failure.  She was able to discuss past family history with other members and it is been confirmed that her brother  of a thoracic aortic aneurysm rupture.    Given her family history of thoracic aortic aneurysm rupture a CT without contrast was performed showing normal caliber thoracic aorta.  Vascular calcifications were noted within the coronary distribution and given recurrent atypical chest pain a stress study was performed showing no evidence of myocardial ischemia.  Since starting low-dose metoprolol she is done well.  She remains active without limiting symptoms.  She has occasional palpitations that last anywhere from 15 to 30 seconds usually at night.  She does not have symptoms when she is exercising.  She is tolerating medications well.      She is doing very well since last visit.  Blood pressure and heart rate are well-controlled.  She has very short episodes of palpitations about 2 to 3 times per week lasting only seconds to minutes at a time.  No angina.  No orthopnea, PND or edema.  No dizziness or syncope.  Social and family history was reviewed and is not pertinent to this clinic visit.    The following portions of the patient's history were reviewed and updated as appropriate: allergies, current medications, past family history, past medical history, past social history, past surgical history and problem list.      Review of Systems   Constitutional: Negative for chills and fever.   HENT:  Negative for hoarse voice  "and sore throat.    Eyes:  Negative for double vision and photophobia.   Cardiovascular:  Negative for chest pain, leg swelling, near-syncope, orthopnea, palpitations, paroxysmal nocturnal dyspnea and syncope.   Respiratory:  Negative for cough and wheezing.    Skin:  Negative for itching, poor wound healing and rash.   Musculoskeletal:  Negative for arthritis, joint pain and joint swelling.   Gastrointestinal:  Negative for bloating, abdominal pain, hematemesis and hematochezia.   Neurological:  Negative for dizziness and focal weakness.   Psychiatric/Behavioral:  Negative for altered mental status, depression and suicidal ideas.    All other systems reviewed and are negative.    ROS was reviewed, updated and amended when necessary.    OBJECTIVE:   Vital Signs  Vitals:    04/03/24 0831   BP: 120/80   Pulse: 55     Estimated body mass index is 26.03 kg/m² as calculated from the following:    Height as of this encounter: 170.2 cm (67\").    Weight as of this encounter: 75.4 kg (166 lb 3.2 oz).    Physical Exam  Vitals reviewed.   Constitutional:       Appearance: She is well-developed.   HENT:      Head: Normocephalic and atraumatic.   Eyes:      Conjunctiva/sclera: Conjunctivae normal.      Pupils: Pupils are equal, round, and reactive to light.   Neck:      Thyroid: No thyromegaly.      Vascular: No JVD.   Cardiovascular:      Heart sounds: No murmur heard.     No friction rub. No gallop.   Pulmonary:      Effort: No respiratory distress.   Chest:      Chest wall: No tenderness.   Abdominal:      General: Bowel sounds are normal. There is no distension.   Musculoskeletal:         General: No tenderness.   Skin:     Findings: No erythema or rash.   Neurological:      Mental Status: She is alert and oriented to person, place, and time.   Psychiatric:         Judgment: Judgment normal.     Physical exam was reviewed, updated and amended when necessary.      ECG 12 Lead    Date/Time: 4/3/2024 8:33 AM  Performed by: " Genaro Jain Jr., MD    Authorized by: Genaro Jain Jr., MD            ASSESSMENT:     Atypical chest pain  PSVT  Hypothyroidism    PLAN OF CARE:     PSVT -no sustained episodes on atenolol.  Tolerating medication well.  Continue same.  Chest discomfort -resolved.  No evidence of thoracic aortic aneurysm and no evidence of myocardial ischemia on previous stress study.  Mild vascular calcifications were noted in the coronary distribution but the patient has well-controlled lipids and her ASCVD is 3.5%.  No indication for starting aspirin at this time.  Hypothyroidism -TSH within normal range.    Return to clinic 12 months             Discharge Medications            Accurate as of April 3, 2024  8:33 AM. If you have any questions, ask your nurse or doctor.                Continue These Medications        Instructions Start Date   atenolol 25 MG tablet  Commonly known as: TENORMIN   TAKE ONE TABLET BY MOUTH DAILY      levothyroxine 50 MCG tablet  Commonly known as: SYNTHROID, LEVOTHROID   50 mcg, Oral, Daily      multivitamin tablet tablet  Commonly known as: THERAGRAN   1 tablet, Oral, Daily      tretinoin 0.025 % cream  Commonly known as: RETIN-A   1 Application, Topical, Nightly               Thank you for allowing me to participate in the care of your patient,      Sincerely,   Genaro Jain MD  [facility]  04/03/24  08:33 EDT

## 2025-01-22 ENCOUNTER — TRANSCRIBE ORDERS (OUTPATIENT)
Dept: ADMINISTRATIVE | Facility: HOSPITAL | Age: 65
End: 2025-01-22
Payer: COMMERCIAL

## 2025-01-22 DIAGNOSIS — Z12.31 SCREENING MAMMOGRAM, ENCOUNTER FOR: Primary | ICD-10-CM

## 2025-03-18 ENCOUNTER — HOSPITAL ENCOUNTER (OUTPATIENT)
Dept: MAMMOGRAPHY | Facility: HOSPITAL | Age: 65
Discharge: HOME | End: 2025-03-18
Admitting: INTERNAL MEDICINE
Payer: COMMERCIAL

## 2025-03-18 DIAGNOSIS — Z12.31 SCREENING MAMMOGRAM, ENCOUNTER FOR: ICD-10-CM

## 2025-03-18 PROCEDURE — 77067 SCR MAMMO BI INCL CAD: CPT

## 2025-03-18 PROCEDURE — 77063 BREAST TOMOSYNTHESIS BI: CPT

## 2025-04-07 ENCOUNTER — OFFICE VISIT (OUTPATIENT)
Dept: CARDIOLOGY | Age: 65
End: 2025-04-07
Payer: COMMERCIAL

## 2025-04-07 VITALS
DIASTOLIC BLOOD PRESSURE: 80 MMHG | HEIGHT: 67 IN | BODY MASS INDEX: 27.31 KG/M2 | SYSTOLIC BLOOD PRESSURE: 126 MMHG | WEIGHT: 174 LBS | HEART RATE: 47 BPM | OXYGEN SATURATION: 97 %

## 2025-04-07 DIAGNOSIS — I47.10 PSVT (PAROXYSMAL SUPRAVENTRICULAR TACHYCARDIA): Primary | ICD-10-CM

## 2025-04-07 PROCEDURE — 93000 ELECTROCARDIOGRAM COMPLETE: CPT | Performed by: NURSE PRACTITIONER

## 2025-04-07 PROCEDURE — 99214 OFFICE O/P EST MOD 30 MIN: CPT | Performed by: NURSE PRACTITIONER

## 2025-04-07 NOTE — PROGRESS NOTES
Date of Office Visit: 25  Encounter Provider: MEGAN Ewing  Place of Service: [facility] CARDIOLOGY  Patient Name: Deb Pineda  :1960    Chief Complaint   Patient presents with    Follow-up   :     HPI: Deb Pineda is a 64 y.o. female  with paroxysmal supraventricular tachycardia, coronary artery calcification by CT, syncope, and hypothyroidism  She has family history with both parents were noted to have atrial fibrillation.  She also has a brother who  of ruptured aortic aneurysm but she is unsure of whether or not this was thoracic versus abdominal.        She is followed by Dr. Genaro Jain.  I will visit with her in follow up today and have reviewed her medical record.     In 2020, she had dizzy spells and a fall where she did not lose consciousness initially.  She walked in the kitchen to see her .  She looked at the injury on her head and her eyes rolled backwards and she passed out for few seconds with no convulsive activity and no  loss of bowel or bladder.  She had an unremarkable echocardiogram.  She wore an extended ambulatory telemetry 2020 which showed frequent nonsustained episodes of SVT with heart rate going up to 200s and she was started on low-dose atenolol.  She had a CT without contrast performed which showed normal caliber thoracic aorta.  There are vascular calcifications noted within the coronary arteries she had a stress study which showed no evidence of ischemia 2020     In  she had an episode of syncope after receiving conscious sedation.  Her pulse was reportedly in the 30s and atenolol was resumed.    She presents today for reassessment.  She continues to take atenolol 25 mg daily.  She has rare palpitations which typically occur at night and last under a minute.  These are very infrequent.  No chest pain or shortness of breath or edema or dizziness.  She does walking and strength classes to stay  "active.  She typically exercises 4 to 5 days a week.        Allergies   Allergen Reactions    Codeine Unknown (See Comments)     Unknown reaction           Family and social history reviewed.     ROS  All other systems were reviewed and are negative          Objective:     Vitals:    04/07/25 0816   BP: 126/80   Pulse: (!) 47   SpO2: 97%   Weight: 78.9 kg (174 lb)   Height: 170.2 cm (67\")     Body mass index is 27.25 kg/m².    PHYSICAL EXAM:  Cardiovascular:      Bradycardia present. Regular rhythm.           ECG 12 Lead    Date/Time: 4/7/2025 8:31 AM  Performed by: Lily Mooney APRN    Authorized by: Lily Mooney APRN  Comparison: compared with previous ECG   Similar to previous ECG  Rhythm: sinus bradycardia  Rate: normal  T flattening: aVL  QRS axis: normal  Comments: No significant change            Current Outpatient Medications   Medication Sig Dispense Refill    atenolol (TENORMIN) 25 MG tablet TAKE ONE TABLET BY MOUTH DAILY 90 tablet 3    levothyroxine (SYNTHROID, LEVOTHROID) 50 MCG tablet Take 1 tablet by mouth Daily. 90 tablet 0    Multiple Vitamin (MULTI VITAMIN DAILY PO) Take 1 tablet by mouth Daily.       No current facility-administered medications for this visit.     Assessment:       Diagnosis Plan   1. PSVT (paroxysmal supraventricular tachycardia)             No orders of the defined types were placed in this encounter.        Plan:       1.  64-year-old female with paroxysmal supraventricular tachycardia.  She has been stable on atenolol since 2020.   - She is doing very well and her PCP is filling atenolol.  She will continue the same and call with any issues otherwise follow-up in 1 year  2.  Coronary calcification noted on prior CT.  She had a nonischemic perfusion stress test in 2020.  No chest pain or dyspnea on exertion with routine exercise.  3.  Hypothyroidism on replacement therapy  4.  Hyperlipidemia.  Not on statin therapy   6.  History of syncope-no recurrence since " 2023          It has been a pleasure to participate in this patient's care.      Thank you,  MEGAN Ewing      **I used Dragon to dictate this note:**